# Patient Record
Sex: FEMALE | Employment: UNEMPLOYED | URBAN - METROPOLITAN AREA
[De-identification: names, ages, dates, MRNs, and addresses within clinical notes are randomized per-mention and may not be internally consistent; named-entity substitution may affect disease eponyms.]

---

## 2024-02-19 ENCOUNTER — APPOINTMENT (OUTPATIENT)
Dept: RADIOLOGY | Facility: AMBULARY SURGERY CENTER | Age: 24
End: 2024-02-19
Payer: COMMERCIAL

## 2024-02-19 ENCOUNTER — OFFICE VISIT (OUTPATIENT)
Dept: OBGYN CLINIC | Facility: CLINIC | Age: 24
End: 2024-02-19
Payer: COMMERCIAL

## 2024-02-19 VITALS — WEIGHT: 130 LBS | BODY MASS INDEX: 23.92 KG/M2 | HEIGHT: 62 IN

## 2024-02-19 DIAGNOSIS — M54.6 ACUTE RIGHT-SIDED THORACIC BACK PAIN: ICD-10-CM

## 2024-02-19 DIAGNOSIS — M54.6 ACUTE RIGHT-SIDED THORACIC BACK PAIN: Primary | ICD-10-CM
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
## 2024-02-19 PROBLEM — M54.50 ACUTE BILATERAL LOW BACK PAIN WITHOUT SCIATICA: Status: ACTIVE | Noted: 2024-02-19

## 2024-02-19 PROCEDURE — 99204 OFFICE O/P NEW MOD 45 MIN: CPT | Performed by: PHYSICAL MEDICINE & REHABILITATION

## 2024-02-19 PROCEDURE — 72072 X-RAY EXAM THORAC SPINE 3VWS: CPT

## 2024-02-19 RX ORDER — NAPROXEN 500 MG/1
500 TABLET ORAL 2 TIMES DAILY PRN
Qty: 60 TABLET | Refills: 0 | Status: SHIPPED | OUTPATIENT
Start: 2024-02-19

## 2024-02-19 NOTE — LETTER
To Whom It May Concern,    Windy Alvarez is under my professional care.  She was seen in my office on February 19, 2024.      She is cleared to return to work with the following restrictions:    Desk duty only.  No lifting/pulling/pushing above 5 pounds.  If this is not available, she remains out of work.  Windy will be reevaluated in about 3-4 weeks.    Please excuse Windy Alvarez from any work missed on this appointment date.    If you have any questions or concerns, please do not hesitate to call.        Sincerely,          Vargas Yoo, DO

## 2024-02-19 NOTE — PATIENT INSTRUCTIONS
Room temperature/warm soaks with epsom salt can help with muscle tightness/cramping.  Epsom salt releases magnesium which can be helpful.    Over-the-counter salonpas patches can be applied to the area of discomfort to help with pain.  There are two types of patches; one contains lidocaine 4% and the other contains menthol (and sometimes camphor and methyl salicylate).  You can try one or the other and see which one works best for you.     You will be starting physical therapy.      It is important to do home exercises as given by your physical therapist as you go through PT to speed up your recovery.    Physical therapy addresses the problems that are causing your pain, instead of covering them up, as some other treatment options do.     Rules for limiting activity by pain symptoms:      -You can work through some pain, but keep it at a level from which you are distractible. Pain should not exceed 3/10 in severity.   -Do not change your biomechanics / form with your activity.  This can lead to further injury.   -If you pay for your activity later with substantial symptom exacerbation, you are not yet ready for that level of exertion.   
Never smoker
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
## 2024-02-20 ENCOUNTER — TELEPHONE (OUTPATIENT)
Dept: OBGYN CLINIC | Facility: HOSPITAL | Age: 24
End: 2024-02-20

## 2024-02-20 NOTE — TELEPHONE ENCOUNTER
Patient given fax number for [facility] to have forms faxed over.
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
## 2024-03-11 ENCOUNTER — TELEPHONE (OUTPATIENT)
Age: 24
End: 2024-03-11

## 2024-03-11 NOTE — TELEPHONE ENCOUNTER
Caller: patient    Doctor:     Reason for call: patient did not attend PT appointment due to a family emergency, patient would like to know what to do moving forward & would like to know if she should keep appointment for 03/15/2024  Please advise     Call back#: [phone]

## 2024-03-19 ENCOUNTER — EVALUATION (OUTPATIENT)
Dept: PHYSICAL THERAPY | Facility: CLINIC | Age: 24
End: 2024-03-19
Payer: COMMERCIAL

## 2024-03-19 DIAGNOSIS — M54.12 CERVICAL RADICULOPATHY: ICD-10-CM

## 2024-03-19 DIAGNOSIS — M54.6 ACUTE RIGHT-SIDED THORACIC BACK PAIN: Primary | ICD-10-CM

## 2024-03-19 PROCEDURE — 97110 THERAPEUTIC EXERCISES: CPT | Performed by: PHYSICAL THERAPIST

## 2024-03-19 PROCEDURE — 97161 PT EVAL LOW COMPLEX 20 MIN: CPT | Performed by: PHYSICAL THERAPIST

## 2024-03-19 PROCEDURE — 97112 NEUROMUSCULAR REEDUCATION: CPT | Performed by: PHYSICAL THERAPIST
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
## 2024-03-22 ENCOUNTER — TELEPHONE (OUTPATIENT)
Age: 24
End: 2024-03-22

## 2024-03-22 ENCOUNTER — OFFICE VISIT (OUTPATIENT)
Dept: PHYSICAL THERAPY | Facility: CLINIC | Age: 24
End: 2024-03-22
Payer: COMMERCIAL

## 2024-03-22 DIAGNOSIS — M54.12 CERVICAL RADICULOPATHY: ICD-10-CM

## 2024-03-22 DIAGNOSIS — M54.6 ACUTE RIGHT-SIDED THORACIC BACK PAIN: Primary | ICD-10-CM

## 2024-03-22 PROCEDURE — 97112 NEUROMUSCULAR REEDUCATION: CPT

## 2024-03-22 PROCEDURE — 97110 THERAPEUTIC EXERCISES: CPT

## 2024-03-22 NOTE — TELEPHONE ENCOUNTER
Caller: Lyssa Richardson     Doctor: Fredo    Reason for call: Calling to verify disability forms where received- let her know they where scanned into patient's chart this morning, and will be completed and sent back with in 10 days.     Call back#: [phone]

## 2024-03-22 NOTE — PROGRESS NOTES
Daily Note     Today's date: 3/22/2024  Patient name: Windy Alvarez  : 2000  MRN: [id number]  Referring provider: Vargas Yoo DO  Dx:   Encounter Diagnosis     ICD-10-CM    1. Acute right-sided thoracic back pain  M54.6       2. Cervical radiculopathy  M54.12           Start Time: 1500  Stop Time: 1534  Total time in clinic (min): 34 minutes    Subjective: Pt reports being very sore following the IE. Complaint with HEP.     Objective: See treatment diary below      Assessment: Tolerated treatment well. Patient demonstrated fatigue post treatment      Plan: Continue per plan of care.      Precautions: Fall Concern    Manuals 3/19 03/22                                                               Neuro Re-Ed             Education and progression 10 Min 10 min            Bank Account/Cut Finger performed            Sitting tolerance education performed                                                                Ther Ex             CS Retraction 5 x 5-10 NB 2x10            progression Pt OP I NW.                          Supine Retraction  4 x 5 D NB 2x10            Progression?                                                     Ther Activity             CS R SB/Rot and Ext B B           Shoulder ER strength 3+ to 5 B            Other concerns?              Lifting?                            Modalities             HP prn.   10 min MHP

## 2024-03-27 ENCOUNTER — OFFICE VISIT (OUTPATIENT)
Dept: PHYSICAL THERAPY | Facility: CLINIC | Age: 24
End: 2024-03-27
Payer: COMMERCIAL

## 2024-03-27 DIAGNOSIS — M54.6 ACUTE RIGHT-SIDED THORACIC BACK PAIN: Primary | ICD-10-CM

## 2024-03-27 DIAGNOSIS — M54.12 CERVICAL RADICULOPATHY: ICD-10-CM

## 2024-03-27 PROCEDURE — 97110 THERAPEUTIC EXERCISES: CPT

## 2024-03-27 PROCEDURE — 97112 NEUROMUSCULAR REEDUCATION: CPT

## 2024-03-27 NOTE — PROGRESS NOTES
Daily Note     Today's date: 3/27/2024  Patient name: Windy Alvarez  : 2000  MRN: [id number]  Referring provider: Vargas Yoo DO  Dx:   Encounter Diagnosis     ICD-10-CM    1. Acute right-sided thoracic back pain  M54.6       2. Cervical radiculopathy  M54.12           Start Time: 1531  Stop Time: 1605  Total time in clinic (min): 34 minutes    Subjective: Pt reports that she bought a lumbar roll and has been very cognizant of her posture. Maximally compliant with HEP.       Objective: See treatment diary below      Assessment: Tolerated treatment well. Did well with TS ext. Tolerates supine CS ret better than seated. Patient would benefit from continued PT      Plan: Continue per plan of care.      Precautions: Fall Concern    Manuals 3/19 03/22 03/27                                                              Neuro Re-Ed             Education and progression 10 Min 10 min  10 min           Bank Account/Cut Finger performed            Sitting tolerance education performed                                                                Ther Ex             CS Retraction 5 x 5-10 NB 2x10  3x5           progression Pt OP I NW.                          Supine Retraction  4 x 5 D NB 2x10  4x5           Progression?              TS ext    2x5                                     Ther Activity             CS R SB/Rot and Ext B B B          Shoulder ER strength 3+ to 5 B  B           Other concerns?              Lifting?                            Modalities             HP prn.   10 min MHP 10 min MHP

## 2024-03-29 ENCOUNTER — OFFICE VISIT (OUTPATIENT)
Dept: PHYSICAL THERAPY | Facility: CLINIC | Age: 24
End: 2024-03-29
Payer: COMMERCIAL

## 2024-03-29 DIAGNOSIS — M54.12 CERVICAL RADICULOPATHY: ICD-10-CM

## 2024-03-29 DIAGNOSIS — M54.6 ACUTE RIGHT-SIDED THORACIC BACK PAIN: Primary | ICD-10-CM

## 2024-03-29 PROCEDURE — 97110 THERAPEUTIC EXERCISES: CPT

## 2024-03-29 PROCEDURE — 97112 NEUROMUSCULAR REEDUCATION: CPT

## 2024-03-29 NOTE — PROGRESS NOTES
Daily Note     Today's date: 3/29/2024  Patient name: Windy Alvarez  : 2000  MRN: [id number]  Referring provider: Vargas Yoo DO  Dx:   Encounter Diagnosis     ICD-10-CM    1. Acute right-sided thoracic back pain  M54.6       2. Cervical radiculopathy  M54.12           Start Time: 1500  Stop Time: 1535  Total time in clinic (min): 35 minutes    Subjective: Pt reports that she remains compliant with her HEP.       Objective: See treatment diary below      Assessment: Tolerated treatment well.  Patient would benefit from continued PT      Plan: Continue per plan of care.      Precautions: Fall Concern    Manuals 3/19 03/22 03/27 03/29                                                             Neuro Re-Ed             Education and progression 10 Min 10 min  10 min  10 min          Bank Account/Cut Finger performed            Sitting tolerance education performed                                                                Ther Ex             CS Retraction 5 x 5-10 NB 2x10  3x5  3x5         progression Pt OP I NW.                          Supine Retraction  4 x 5 D NB 2x10  4x5  4x5          Progression?              TS ext    2x5  2x5                                    Ther Activity             CS R SB/Rot and Ext B B B          Shoulder ER strength 3+ to 5 B  B           Other concerns?              Lifting?                            Modalities             HP prn.   10 min MHP 10 min MHP 10 min MHP

## 2024-04-03 ENCOUNTER — OFFICE VISIT (OUTPATIENT)
Dept: PHYSICAL THERAPY | Facility: CLINIC | Age: 24
End: 2024-04-03
Payer: COMMERCIAL

## 2024-04-03 DIAGNOSIS — M54.6 ACUTE RIGHT-SIDED THORACIC BACK PAIN: Primary | ICD-10-CM

## 2024-04-03 DIAGNOSIS — M54.12 CERVICAL RADICULOPATHY: ICD-10-CM

## 2024-04-03 PROCEDURE — 97110 THERAPEUTIC EXERCISES: CPT

## 2024-04-03 PROCEDURE — 97112 NEUROMUSCULAR REEDUCATION: CPT

## 2024-04-03 NOTE — PROGRESS NOTES
Daily Note     Today's date: 4/3/2024  Patient name: Windy Alvarez  : 2000  MRN: [id number]  Referring provider: Vargas Yoo DO  Dx:   Encounter Diagnosis     ICD-10-CM    1. Acute right-sided thoracic back pain  M54.6       2. Cervical radiculopathy  M54.12           Start Time: 1531  Stop Time: 1610  Total time in clinic (min): 39 minutes    Subjective: Pt reports more R sided neck pain. Notices that when she does her retractions it bothers her, but then goes away.       Objective: See treatment diary below      Assessment: Tolerated treatment well. No response to lateral movements. Patient would benefit from continued PT      Plan: Continue per plan of care.      Precautions: Fall Concern    Manuals 3/19 03/22 03/27 03/29 04/03                                                            Neuro Re-Ed             Education and progression 10 Min 10 min  10 min  10 min  10 min         Bank Account/Cut Finger performed            Sitting tolerance education performed                                                                Ther Ex             CS Retraction 5 x 5-10 NB 2x10  3x5  3x5 3x5   I, NW        progression Pt OP I NW.             R SB      2x10 NE        R SB +OP     4x10   NE        Supine Retraction  4 x 5 D NB 2x10  4x5  4x5  4x5         Progression?              TS ext    2x5  2x5  2x5                                   Ther Activity             CS R SB/Rot and Ext B B B  nil        Shoulder ER strength 3+ to 5 B  B           Other concerns?              Lifting?                            Modalities             HP prn.   10 min MHP 10 min MHP 10 min MHP  10 min MHP

## 2024-04-08 ENCOUNTER — OFFICE VISIT (OUTPATIENT)
Dept: PHYSICAL THERAPY | Facility: CLINIC | Age: 24
End: 2024-04-08
Payer: COMMERCIAL

## 2024-04-08 DIAGNOSIS — M54.6 ACUTE RIGHT-SIDED THORACIC BACK PAIN: Primary | ICD-10-CM

## 2024-04-08 DIAGNOSIS — M54.12 CERVICAL RADICULOPATHY: ICD-10-CM

## 2024-04-08 PROCEDURE — 97110 THERAPEUTIC EXERCISES: CPT

## 2024-04-08 PROCEDURE — 97112 NEUROMUSCULAR REEDUCATION: CPT

## 2024-04-08 NOTE — PROGRESS NOTES
"Daily Note     Today's date: 2024  Patient name: Windy Alvarez  : 2000  MRN: [id number]  Referring provider: Vargas Yoo DO  Dx:   Encounter Diagnosis     ICD-10-CM    1. Acute right-sided thoracic back pain  M54.6       2. Cervical radiculopathy  M54.12                      Subjective: Patient reports her neck pain came back but she did the exercises and it went away. At start of session she denies cervical radicular ex. She does note a \"knot\" in her shoulder blade.       Objective: See treatment diary below      Assessment: Cont with current POC. She cont to have positive response to ext biased exercises although she has not had lasting change as of yet. She had decreased sx that were less intense following ext seated on foam to address lower T/S ext. Encouraged to perform every 2 hrs and as symptoms arise, stopping if symptoms peripheral. Patient demonstrated fatigue post treatment and would benefit from continued PT      Plan: Progress treatment as tolerated.       Precautions: Fall Concern    Manuals 3/19 03/22 03/27 03/29 04/03 04/08                                                           Neuro Re-Ed             Education and progression 10 Min 10 min  10 min  10 min  10 min  10 min       Bank Account/Cut Finger performed            Sitting tolerance education performed                                                                Ther Ex             CS Retraction 5 x 5-10 NB 2x10  3x5  3x5 3x5   I, NW 3x5       progression Pt OP I NW.             R SB      2x10 NE        R SB +OP     4x10   NE        Supine Retraction  4 x 5 D NB 2x10  4x5  4x5  4x5  4x5       Progression?              TS ext    2x5  2x5  2x5  3x5  D, NW  On foam   2x5  D, B                                 Ther Activity             CS R SB/Rot and Ext B B B  nil        Shoulder ER strength 3+ to 5 B  B           Other concerns?              Lifting?                            Modalities             HP prn.   10 min MHP 10 " min MHP 10 min MHP  10 min MHP

## 2024-04-10 ENCOUNTER — OFFICE VISIT (OUTPATIENT)
Dept: PHYSICAL THERAPY | Facility: CLINIC | Age: 24
End: 2024-04-10
Payer: COMMERCIAL

## 2024-04-10 DIAGNOSIS — M54.12 CERVICAL RADICULOPATHY: ICD-10-CM

## 2024-04-10 DIAGNOSIS — M54.6 ACUTE RIGHT-SIDED THORACIC BACK PAIN: Primary | ICD-10-CM

## 2024-04-10 PROCEDURE — 97110 THERAPEUTIC EXERCISES: CPT

## 2024-04-10 NOTE — PROGRESS NOTES
"Daily Note     Today's date: 4/10/2024  Patient name: Windy Alvarez  : 2000  MRN: [id number]  Referring provider: Vargas Yoo DO  Dx:   Encounter Diagnosis     ICD-10-CM    1. Acute right-sided thoracic back pain  M54.6       2. Cervical radiculopathy  M54.12           Start Time: 1530  Stop Time: 1605  Total time in clinic (min): 35 minutes    Subjective: Pt reports that she can tolerated seated retraction a little more.       Objective: See treatment diary below  Presents asymptomatic     Assessment: Tolerated treatment well. Continues with limited tolerance to seated CS retraction.  Patient would benefit from continued PT      Plan: Continue per plan of care.      Precautions: Fall Concern    Manuals 3/19 03/22 03/27 03/29 04/03 04/08 04/10                                                          Neuro Re-Ed             Education and progression 10 Min 10 min  10 min  10 min  10 min  10 min 5 min       Bank Account/Cut Finger performed            Sitting tolerance education performed            Scap retraction        10x5\"      Scaption        2x10                                 Ther Ex             CS Retraction 5 x 5-10 NB 2x10  3x5  3x5 3x5   I, NW 3x5 3x5       progression Pt OP I NW.             R SB      2x10 NE        R SB +OP     4x10   NE        Supine Retraction  4 x 5 D NB 2x10  4x5  4x5  4x5  4x5 4x5       Progression?              TS ext    2x5  2x5  2x5  3x5  D, NW  On foam   2x5  D, B 4x5                                 Ther Activity             CS R SB/Rot and Ext B B B  nil  Nil       Shoulder ER strength 3+ to 5 B  B           Other concerns?              Lifting?                            Modalities             HP prn.   10 min MHP 10 min MHP 10 min MHP  10 min MHP  10 min MHP                                "

## 2024-04-15 ENCOUNTER — APPOINTMENT (OUTPATIENT)
Dept: PHYSICAL THERAPY | Facility: CLINIC | Age: 24
End: 2024-04-15
Payer: COMMERCIAL

## 2024-04-17 ENCOUNTER — OFFICE VISIT (OUTPATIENT)
Dept: PHYSICAL THERAPY | Facility: CLINIC | Age: 24
End: 2024-04-17
Payer: COMMERCIAL

## 2024-04-17 DIAGNOSIS — M54.6 ACUTE RIGHT-SIDED THORACIC BACK PAIN: Primary | ICD-10-CM

## 2024-04-17 DIAGNOSIS — M54.12 CERVICAL RADICULOPATHY: ICD-10-CM

## 2024-04-17 PROCEDURE — 97112 NEUROMUSCULAR REEDUCATION: CPT

## 2024-04-17 PROCEDURE — 97110 THERAPEUTIC EXERCISES: CPT

## 2024-04-17 NOTE — PROGRESS NOTES
"Daily Note     Today's date: 2024  Patient name: Windy Alvarez  : 2000  MRN: [id number]  Referring provider: Vargas Yoo DO  Dx:   Encounter Diagnosis     ICD-10-CM    1. Acute right-sided thoracic back pain  M54.6       2. Cervical radiculopathy  M54.12                      Subjective: Pt reports that she has noticed that she is able to lift heavier plates now. Remains maximally compliant with HEP.       Objective: See treatment diary below      Assessment: Tolerated treatment well. Good range with CS ret+ext but reps limited to tolerance. Patient would benefit from continued PT      Plan: Continue per plan of care.      Precautions: Fall Concern    Manuals 3/19 03/22 03/27 03/29 04/03 04/08 04/10 04/17                                                         Neuro Re-Ed             Education and progression 10 Min 10 min  10 min  10 min  10 min  10 min 5 min  5 min      Bank Account/Cut Finger performed            Sitting tolerance education performed            Scap retraction        10x5\" 10x5\"      Scaption        2x10  2x10                                Ther Ex             CS Retraction 5 x 5-10 NB 2x10  3x5  3x5 3x5   I, NW 3x5 3x5  3x5     progression Pt OP I NW.        +ext 5x     R SB      2x10 NE        R SB +OP     4x10   NE        Supine Retraction  4 x 5 D NB 2x10  4x5  4x5  4x5  4x5 4x5  4x5     Progression?              TS ext    2x5  2x5  2x5  3x5  D, NW  On foam   2x5  D, B 4x5  4x5                                Ther Activity             CS R SB/Rot and Ext B B B  nil  Nil       Shoulder ER strength 3+ to 5 B  B           Other concerns?              Lifting?                            Modalities             HP prn.   10 min MHP 10 min MHP 10 min MHP  10 min MHP  10 min MHP 10 min MHP                                 "

## 2024-04-24 ENCOUNTER — EVALUATION (OUTPATIENT)
Dept: PHYSICAL THERAPY | Facility: CLINIC | Age: 24
End: 2024-04-24
Payer: COMMERCIAL

## 2024-04-24 DIAGNOSIS — M54.6 ACUTE RIGHT-SIDED THORACIC BACK PAIN: Primary | ICD-10-CM

## 2024-04-24 DIAGNOSIS — M54.12 CERVICAL RADICULOPATHY: ICD-10-CM

## 2024-04-24 PROCEDURE — 97112 NEUROMUSCULAR REEDUCATION: CPT | Performed by: PHYSICAL THERAPIST

## 2024-04-24 PROCEDURE — 97110 THERAPEUTIC EXERCISES: CPT | Performed by: PHYSICAL THERAPIST

## 2024-04-26 ENCOUNTER — OFFICE VISIT (OUTPATIENT)
Dept: PHYSICAL THERAPY | Facility: CLINIC | Age: 24
End: 2024-04-26
Payer: COMMERCIAL

## 2024-04-26 DIAGNOSIS — M54.6 ACUTE RIGHT-SIDED THORACIC BACK PAIN: Primary | ICD-10-CM

## 2024-04-26 DIAGNOSIS — M54.12 CERVICAL RADICULOPATHY: ICD-10-CM

## 2024-04-26 PROCEDURE — 97110 THERAPEUTIC EXERCISES: CPT

## 2024-04-26 NOTE — PROGRESS NOTES
"Daily Note     Today's date: 2024  Patient name: Windy Alvarez  : 2000  MRN: [id number]  Referring provider: Vargas Yoo DO  Dx:   Encounter Diagnosis     ICD-10-CM    1. Acute right-sided thoracic back pain  M54.6       2. Cervical radiculopathy  M54.12           Start Time: 1530  Stop Time: 1600  Total time in clinic (min): 30 minutes    Subjective: Pt reports that CS ret+ext is hard for her.       Objective: See treatment diary below      Assessment: Tolerated treatment well. ROM still improving. Patient would benefit from continued PT      Plan: Continue per plan of care.      Precautions: Fall Concern    Manuals 3/19 03/22 03/27 03/29 04/03 04/08 04/10 04/17 4/24 04/26                                                       Neuro Re-Ed             Education and progression 10 Min 10 min  10 min  10 min  10 min  10 min 5 min  5 min  10 min    Bank Account/Cut Finger performed            Sitting tolerance education performed            Scap retraction        10x5\" 10x5\"  10 x 5\" 10x5\"   Scaption        2x10  2x10  2 x 10 2x10                              Ther Ex             CS Retraction 5 x 5-10 NB 2x10  3x5  3x5 3x5   I, NW 3x5 3x5  3x5 4 x 5 4x5    progression Pt OP I NW.        +ext 5x 2 x 5 5x +ext    R SB      2x10 NE        R SB +OP     4x10   NE        Supine Retraction  4 x 5 D NB 2x10  4x5  4x5  4x5  4x5 4x5  4x5 4  x5 4x5    Progression?              TS ext    2x5  2x5  2x5  3x5  D, NW  On foam   2x5  D, B 4x5  4x5  4 x 5 4x5                              Ther Activity             CS R SB/Rot and Ext B B B  nil  Nil       Shoulder ER strength 3+ to 5 B  B           Other concerns?              Lifting?                            Modalities             HP prn.   10 min MHP 10 min MHP 10 min MHP  10 min MHP  10 min MHP 10 min MHP dec 10 min MHP                         "

## 2024-04-29 ENCOUNTER — OFFICE VISIT (OUTPATIENT)
Dept: PHYSICAL THERAPY | Facility: CLINIC | Age: 24
End: 2024-04-29
Payer: COMMERCIAL

## 2024-04-29 DIAGNOSIS — M54.12 CERVICAL RADICULOPATHY: ICD-10-CM

## 2024-04-29 DIAGNOSIS — M54.6 ACUTE RIGHT-SIDED THORACIC BACK PAIN: Primary | ICD-10-CM

## 2024-04-29 PROCEDURE — 97110 THERAPEUTIC EXERCISES: CPT

## 2024-04-29 PROCEDURE — 97112 NEUROMUSCULAR REEDUCATION: CPT

## 2024-04-29 NOTE — PROGRESS NOTES
"Daily Note     Today's date: 2024  Patient name: Windy Alvarez  : 2000  MRN: [id number]  Referring provider: Vargas Yoo DO  Dx:   Encounter Diagnosis     ICD-10-CM    1. Acute right-sided thoracic back pain  M54.6       2. Cervical radiculopathy  M54.12           Start Time: 1530  Stop Time: 1555  Total time in clinic (min): 25 minutes    Subjective: Pt reports that she still has some R sided pain. Compliance with HEP.        Objective: See treatment diary below      Assessment: Tolerated treatment well. R SB had NE on pain but did improve that motion. Patient would benefit from continued PT      Plan: Continue per plan of care.      Precautions: Fall Concern    Manuals 04/29  03/27 03/29 04/03 04/08 04/10 04/17 4/24 04/26                                                       Neuro Re-Ed             Education and progression   10 min  10 min  10 min  10 min 5 min  5 min  10 min    Bank Account/Cut Finger             Sitting tolerance education             Scap retraction  10x5\"      10x5\" 10x5\"  10 x 5\" 10x5\"   Scaption  2x10      2x10  2x10  2 x 10 2x10                              Ther Ex             CS Retraction 4x5  3x5  3x5 3x5   I, NW 3x5 3x5  3x5 4 x 5 4x5    progression 2x5 +ext       +ext 5x 2 x 5 5x +ext    R SB  3x5   NE    2x10 NE        R SB +OP     4x10   NE        Supine Retraction    4x5  4x5  4x5  4x5 4x5  4x5 4  x5 4x5    Progression?              TS ext  4x5   2x5  2x5  2x5  3x5  D, NW  On foam   2x5  D, B 4x5  4x5  4 x 5 4x5                              Ther Activity             CS R SB/Rot and Ext   B  nil  Nil       Shoulder ER strength   B           Other concerns?              Lifting?                            Modalities             HP prn.  Dec   10 min MHP 10 min MHP  10 min MHP  10 min MHP 10 min MHP dec 10 min MHP                           "

## 2024-05-01 ENCOUNTER — OFFICE VISIT (OUTPATIENT)
Dept: PHYSICAL THERAPY | Facility: CLINIC | Age: 24
End: 2024-05-01
Payer: COMMERCIAL

## 2024-05-01 DIAGNOSIS — M54.6 ACUTE RIGHT-SIDED THORACIC BACK PAIN: Primary | ICD-10-CM

## 2024-05-01 DIAGNOSIS — M54.12 CERVICAL RADICULOPATHY: ICD-10-CM

## 2024-05-01 PROCEDURE — 97110 THERAPEUTIC EXERCISES: CPT

## 2024-05-01 PROCEDURE — 97112 NEUROMUSCULAR REEDUCATION: CPT

## 2024-05-01 NOTE — PROGRESS NOTES
"Daily Note     Today's date: 2024  Patient name: Windy Alvarez  : 2000  MRN: [id number]  Referring provider: Vargas Yoo DO  Dx:   Encounter Diagnosis     ICD-10-CM    1. Acute right-sided thoracic back pain  M54.6       2. Cervical radiculopathy  M54.12           Start Time: 1600  Stop Time: 1640  Total time in clinic (min): 40 minutes    Subjective: Pt reports that she notices cervical pain when puts dishes away.       Objective: See treatment diary below      Assessment: Tolerated treatment well.Progressed with functional strengthening with fatigue. Repeated SB does not appear to influence symptoms.  Patient would benefit from continued PT      Plan: Continue per plan of care.      Precautions: Fall Concern    Manuals 04/29 05/01  03/29 04/03 04/08 04/10 04/17 4/24 04/26                                                       Neuro Re-Ed             Education and progression    10 min  10 min  10 min 5 min  5 min  10 min    Bank Account/Cut Finger             Sitting tolerance education             Scap retraction  10x5\"      10x5\" 10x5\"  10 x 5\" 10x5\"   Scaption  2x10 2x10 1#     2x10  2x10  2 x 10 2x10    TB row/ext   RTB 2x10                         Ther Ex             CS Retraction 4x5 4x5  3x5 3x5   I, NW 3x5 3x5  3x5 4 x 5 4x5    progression 2x5 +ext 3x5 +ext      +ext 5x 2 x 5 5x +ext    R SB  3x5   NE    2x10 NE        R SB +OP     4x10   NE        Supine Retraction     4x5  4x5  4x5 4x5  4x5 4  x5 4x5    Progression?              TS ext  4x5  4x5  2x5  2x5  3x5  D, NW  On foam   2x5  D, B 4x5  4x5  4 x 5 4x5                              Ther Activity             CS R SB/Rot and Ext     nil  Nil       Shoulder ER strength             Other concerns?              Lifting?                            Modalities             HP prn.  Dec    10 min MHP  10 min MHP  10 min MHP 10 min MHP dec 10 min MHP                             "

## 2024-05-06 ENCOUNTER — OFFICE VISIT (OUTPATIENT)
Dept: OBGYN CLINIC | Facility: CLINIC | Age: 24
End: 2024-05-06
Payer: COMMERCIAL

## 2024-05-06 ENCOUNTER — OFFICE VISIT (OUTPATIENT)
Dept: PHYSICAL THERAPY | Facility: CLINIC | Age: 24
End: 2024-05-06
Payer: COMMERCIAL

## 2024-05-06 VITALS
HEART RATE: 70 BPM | SYSTOLIC BLOOD PRESSURE: 126 MMHG | BODY MASS INDEX: 23.92 KG/M2 | DIASTOLIC BLOOD PRESSURE: 79 MMHG | WEIGHT: 130 LBS | HEIGHT: 62 IN

## 2024-05-06 DIAGNOSIS — M54.12 CERVICAL RADICULOPATHY: ICD-10-CM

## 2024-05-06 DIAGNOSIS — M54.6 ACUTE RIGHT-SIDED THORACIC BACK PAIN: Primary | ICD-10-CM

## 2024-05-06 PROCEDURE — 97112 NEUROMUSCULAR REEDUCATION: CPT

## 2024-05-06 PROCEDURE — 99213 OFFICE O/P EST LOW 20 MIN: CPT | Performed by: PHYSICAL MEDICINE & REHABILITATION

## 2024-05-06 PROCEDURE — 97110 THERAPEUTIC EXERCISES: CPT
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
## 2024-05-06 NOTE — LETTER
To Whom It May Concern,     Windy Alvarez is under my professional care.  She was seen in my office on May 6, 2024.       She is cleared to return to work with the following restrictions:     Desk duty only.  No lifting/pulling/pushing above 5 pounds.  If this is not available, she remains out of work.     Please excuse Windy Alvarez from any work missed on this appointment date.     If you have any questions or concerns, please do not hesitate to call.           Sincerely,            Vargas Yoo, DO

## 2024-05-06 NOTE — PROGRESS NOTES
"Daily Note     Today's date: 2024  Patient name: Windy Alvarez  : 2000  MRN: [id number]  Referring provider: Vargas Yoo DO  Dx:   Encounter Diagnosis     ICD-10-CM    1. Acute right-sided thoracic back pain  M54.6       2. Cervical radiculopathy  M54.12           Start Time: 1600  Stop Time: 1640  Total time in clinic (min): 40 minutes    Subjective: Pt reports soreness following housecleaning.       Objective: See treatment diary below      Assessment: Tolerated treatment well. Improving tolerance to CS ret+ext. Patient would benefit from continued PT      Plan: Continue per plan of care.      Precautions: Fall Concern HEP [id number]     Manuals 04/29 05/01 05/06  04/03 04/08 04/10 04/17 4/24 04/26                                                       Neuro Re-Ed             Education and progression     10 min  10 min 5 min  5 min  10 min    Bank Account/Cut Finger             Sitting tolerance education             Scap retraction  10x5\"      10x5\" 10x5\"  10 x 5\" 10x5\"   Scaption  2x10 2x10 1# 2x10 1#    2x10  2x10  2 x 10 2x10    TB row/ext   RTB 2x10  RTB 2x10                        Ther Ex             CS Retraction 4x5 4x5 4x5   3x5   I, NW 3x5 3x5  3x5 4 x 5 4x5    progression 2x5 +ext 3x5 +ext 3x5 +ext     +ext 5x 2 x 5 5x +ext    R SB  3x5   NE    2x10 NE        R SB +OP     4x10   NE        Supine Retraction      4x5  4x5 4x5  4x5 4  x5 4x5    Progression?              TS ext  4x5  4x5 4x5   2x5  3x5  D, NW  On foam   2x5  D, B 4x5  4x5  4 x 5 4x5                              Ther Activity             CS R SB/Rot and Ext     nil  Nil       Shoulder ER strength             Other concerns?              Lifting?                            Modalities             HP prn.  Dec   10 min MHP  10 min MHP  10 min MHP 10 min MHP dec 10 min MHP                               "

## 2024-05-08 ENCOUNTER — OFFICE VISIT (OUTPATIENT)
Dept: PHYSICAL THERAPY | Facility: CLINIC | Age: 24
End: 2024-05-08
Payer: COMMERCIAL

## 2024-05-08 DIAGNOSIS — M54.6 ACUTE RIGHT-SIDED THORACIC BACK PAIN: Primary | ICD-10-CM

## 2024-05-08 DIAGNOSIS — M54.12 CERVICAL RADICULOPATHY: ICD-10-CM

## 2024-05-08 PROCEDURE — 97112 NEUROMUSCULAR REEDUCATION: CPT

## 2024-05-08 PROCEDURE — 97110 THERAPEUTIC EXERCISES: CPT

## 2024-05-08 NOTE — PROGRESS NOTES
"Daily Note     Today's date: 2024  Patient name: Windy Alvarez  : 2000  MRN: [id number]  Referring provider: Vargas Yoo DO  Dx:   Encounter Diagnosis     ICD-10-CM    1. Acute right-sided thoracic back pain  M54.6       2. Cervical radiculopathy  M54.12                      Subjective: Pt reports that her MD ordered an MRI.       Objective: See treatment diary below      Assessment: Tolerated treatment well. Fatiguing with current interventions. Patient would benefit from continued PT      Plan: Continue per plan of care.      Precautions: Fall Concern HEP [id number]     Manuals 04/29 05/01 05/06 05/08  04/08 04/10 04/17 4/24 04/26                                                       Neuro Re-Ed             Education and progression    10 min  10 min 5 min  5 min  10 min    Bank Account/Cut Finger             Sitting tolerance education             Scap retraction  10x5\"      10x5\" 10x5\"  10 x 5\" 10x5\"   Scaption  2x10 2x10 1# 2x10 1# 6x 2#    2x10 1#    2x10  2x10  2 x 10 2x10    TB row/ext   RTB 2x10  RTB 2x10  RTB 2x10                       Ther Ex             CS Retraction 4x5 4x5 4x5  4x5   3x5 3x5  3x5 4 x 5 4x5    progression 2x5 +ext 3x5 +ext 3x5 +ext 3x5 +ext    +ext 5x 2 x 5 5x +ext    R SB  3x5   NE            R SB +OP             Supine Retraction       4x5 4x5  4x5 4  x5 4x5    Progression?              TS ext  4x5  4x5 4x5  4x5  3x5  D, NW  On foam   2x5  D, B 4x5  4x5  4 x 5 4x5                              Ther Activity             CS R SB/Rot and Ext       Nil       Shoulder ER strength             Other concerns?              Lifting?                            Modalities             HP prn.  Dec   10 min MHP dec   10 min MHP 10 min MHP dec 10 min MHP                                 "

## 2024-05-13 ENCOUNTER — OFFICE VISIT (OUTPATIENT)
Dept: PHYSICAL THERAPY | Facility: CLINIC | Age: 24
End: 2024-05-13
Payer: COMMERCIAL

## 2024-05-13 DIAGNOSIS — M54.6 ACUTE RIGHT-SIDED THORACIC BACK PAIN: Primary | ICD-10-CM

## 2024-05-13 DIAGNOSIS — M54.12 CERVICAL RADICULOPATHY: ICD-10-CM

## 2024-05-13 PROCEDURE — 97110 THERAPEUTIC EXERCISES: CPT

## 2024-05-13 PROCEDURE — 97112 NEUROMUSCULAR REEDUCATION: CPT

## 2024-05-13 NOTE — PROGRESS NOTES
"Daily Note     Today's date: 2024  Patient name: Windy Alvarez  : 2000  MRN: [id number]  Referring provider: Vargas Yoo DO  Dx:   Encounter Diagnosis     ICD-10-CM    1. Acute right-sided thoracic back pain  M54.6       2. Cervical radiculopathy  M54.12           Start Time: 1530  Stop Time: 1600  Total time in clinic (min): 30 minutes    Subjective: Pt reports that she has pain when she looks up or down.       Objective: See treatment diary below      Assessment: Tolerated treatment well. Fatigues easily with UE postural strengthening. Patient would benefit from continued PT      Plan: Continue per plan of care.      Precautions: Fall Concern HEP [id number]     Manuals 04/29 05/01 05/06 05/08 05/13  04/10 04/17 4/24 04/26                                                       Neuro Re-Ed             Education and progression    10 min   5 min  5 min  10 min    Bank Account/Cut Finger             Sitting tolerance education             Scap retraction  10x5\"      10x5\" 10x5\"  10 x 5\" 10x5\"   Scaption  2x10 2x10 1# 2x10 1# 6x 2#    2x10 1#  3x10 1#  2x10  2x10  2 x 10 2x10    TB row/ext   RTB 2x10  RTB 2x10  RTB 2x10  GTB 2x10                      Ther Ex             CS Retraction 4x5 4x5 4x5  4x5  4x5  3x5  3x5 4 x 5 4x5    progression 2x5 +ext 3x5 +ext 3x5 +ext 3x5 +ext 3x5 +ext   +ext 5x 2 x 5 5x +ext    R SB  3x5   NE            R SB +OP             Supine Retraction        4x5  4x5 4  x5 4x5    Progression?              TS ext  4x5  4x5 4x5  4x5 4x5   4x5  4x5  4 x 5 4x5                              Ther Activity             CS R SB/Rot and Ext       Nil       Shoulder ER strength             Other concerns?              Lifting?                            Modalities             HP prn.  Dec   10 min MHP dec dec  10 min MHP 10 min MHP dec 10 min MHP                                   "

## 2024-05-15 ENCOUNTER — OFFICE VISIT (OUTPATIENT)
Dept: PHYSICAL THERAPY | Facility: CLINIC | Age: 24
End: 2024-05-15
Payer: COMMERCIAL

## 2024-05-15 DIAGNOSIS — M54.6 ACUTE RIGHT-SIDED THORACIC BACK PAIN: Primary | ICD-10-CM

## 2024-05-15 DIAGNOSIS — M54.12 CERVICAL RADICULOPATHY: ICD-10-CM

## 2024-05-15 PROCEDURE — 97110 THERAPEUTIC EXERCISES: CPT

## 2024-05-15 PROCEDURE — 97112 NEUROMUSCULAR REEDUCATION: CPT

## 2024-05-15 NOTE — PROGRESS NOTES
"Daily Note     Today's date: 5/15/2024  Patient name: Windy Alvarez  : 2000  MRN: [id number]  Referring provider: Vargas Yoo DO  Dx:   Encounter Diagnosis     ICD-10-CM    1. Acute right-sided thoracic back pain  M54.6       2. Cervical radiculopathy  M54.12           Start Time: 1530  Stop Time: 1610  Total time in clinic (min): 40 minutes    Subjective: Pt reports that she changed the cat litter and felt sore after this. She did feel sore following last visit's progressions but only for 24 hours.       Objective: See treatment diary below      Assessment: Tolerated treatment well. No progressions due to soreness.  Patient would benefit from continued PT      Plan: Continue per plan of care.      Precautions: Fall Concern HEP [id number]     Manuals 04/29 05/01 05/06 05/08 05/13 05/15  04/17 4/24 04/26                                                       Neuro Re-Ed             Education and progression    10 min    5 min  10 min    Bank Account/Cut Finger             Sitting tolerance education             Scap retraction  10x5\"       10x5\"  10 x 5\" 10x5\"   Scaption  2x10 2x10 1# 2x10 1# 6x 2#    2x10 1#  3x10 1# 3x10 1#  2x10  2 x 10 2x10    TB row/ext   RTB 2x10  RTB 2x10  RTB 2x10  GTB 2x10  GTB 2x10                     Ther Ex             CS Retraction 4x5 4x5 4x5  4x5  4x5 4x5   3x5 4 x 5 4x5    progression 2x5 +ext 3x5 +ext 3x5 +ext 3x5 +ext 3x5 +ext 3x5 +ext  +ext 5x 2 x 5 5x +ext    R SB  3x5   NE            R SB +OP             Supine Retraction         4x5 4  x5 4x5    Progression?              TS ext  4x5  4x5 4x5  4x5 4x5  4x5   4x5  4 x 5 4x5                              Ther Activity             CS R SB/Rot and Ext             Shoulder ER strength             Other concerns?              Lifting?                            Modalities             HP prn.  Dec   10 min MHP dec dec 10 min MHP  10 min MHP dec 10 min MHP                                     "

## 2024-05-20 ENCOUNTER — EVALUATION (OUTPATIENT)
Dept: PHYSICAL THERAPY | Facility: CLINIC | Age: 24
End: 2024-05-20
Payer: COMMERCIAL

## 2024-05-20 DIAGNOSIS — M54.12 CERVICAL RADICULOPATHY: ICD-10-CM

## 2024-05-20 DIAGNOSIS — M54.6 ACUTE RIGHT-SIDED THORACIC BACK PAIN: Primary | ICD-10-CM

## 2024-05-20 PROCEDURE — 97112 NEUROMUSCULAR REEDUCATION: CPT | Performed by: PHYSICAL THERAPIST

## 2024-05-20 PROCEDURE — 97110 THERAPEUTIC EXERCISES: CPT | Performed by: PHYSICAL THERAPIST

## 2024-05-20 NOTE — PROGRESS NOTES
PT Evaluation     Today's date: 2024  Patient name: Windy Alvarez  : 2000  MRN: [id number]  Referring provider: Vargas Yoo DO  Dx:   Encounter Diagnosis     ICD-10-CM    1. Acute right-sided thoracic back pain  M54.6       2. Cervical radiculopathy  M54.12                          Assessment  Impairments: abnormal or restricted ROM, activity intolerance, impaired balance, impaired physical strength, lacks appropriate home exercise program, pain with function, poor posture , poor body mechanics and endurance    Assessment details: Pt is continuing to progress well at this time.  They have demonstrated further improvement in pain levels, strength, range, flexibility as well as overall tolerance to activity.  They will benefit continued Skilled PT intervention in order to achieve all LTGs sought out for them as well as by them in order to perform all desired activities with minimal to nil symptom exacerbation.  Pt demonstrated limitations in thoracic region that improved with repeated TS ext with pt OP.  Will continue to explore this.  Primary focus will be further pain level reduction, strength and endurance.  Thank you very much for this kind and motivated referral.        Understanding of Dx/Px/POC: good     Prognosis: good    Goals  STG 4 Weeks:  Decrease pain at worst to 5 -met  Improve range to improve CS range to min -met  Improve strength to R UE to 4/5.  -met  Independent with HEP -met  LTG 8 Weeks:  Decrease pain at worst to 2 -partial  Improve range to CS range to nil  Improve strength to 5/5.   -partial  Able to perform all desired activities with minimal to nil symptom exacerbation      Plan  Patient would benefit from: skilled physical therapy  Planned modality interventions: thermotherapy: hydrocollator packs and cryotherapy    Planned therapy interventions: joint mobilization, manual therapy, neuromuscular re-education, patient education, postural training, strengthening, stretching,  therapeutic activities, therapeutic exercise, therapeutic training, transfer training, home exercise program, IADL retraining, graded motor, graded exercise, graded activity, functional ROM exercises, flexibility, abdominal trunk stabilization, activity modification, behavior modification and body mechanics training    Frequency: 2x week  Duration in weeks: 10  Treatment plan discussed with: patient        Subjective Evaluation    History of Present Illness  Date of onset: 3/19/2024  Mechanism of injury: Pt presents today stating that she is continuing to feel better.  She reports that pain at worst is 4, at worst is 6/10, but this is very infrequent.  Pt reports that she has been able to go fishing for a few hours at a time, but she must require rest after 20 mins if there is a consistent amount of casting.  Pt reports that her flexibility is continuing make gains.  Pt reports that she recently met with Dr. Yoo who would like to have imagery performed of her ribs.  Pt reports neck is doing fairly well, pain is primarily in between and slightly lower than her shoulder blades. Pt reports goals are to further reduce pain, improve endurance, and be able to get back to how she was prior to accident.    Quality of life: good    Patient Goals  Patient goals for therapy: decreased pain, increased motion, increased strength and return to sport/leisure activities    Pain  Current pain rating: 3  At best pain ratin  At worst pain ratin  Quality: dull ache, tight and sharp  Relieving factors: change in position, heat and medications  Aggravating factors: lifting and sitting  Progression: improved      Diagnostic Tests  X-ray: normal  Treatments  Previous treatment: medication        Objective     Active Range of Motion   Cervical/Thoracic Spine       Cervical  Subcranial protraction:  WFL   Subcranial retraction:  WFL   Flexion:  WFL  Extension:  with pain Restriction level: minimal  Left lateral flexion:   "Restriction level: minimal  Right lateral flexion:  Restriction level minimal  Left rotation:  WFL  Right rotation:  WFL    Additional Active Range of Motion Details  Forward head, rounded shoulders  B/L Sensation intact to light touch C3,4,5,6,7,8,T1,T2  DTR Bi Tri Brac 1+ B/L  Spear (-) B/L  Postural correction R shoulder blade. Better.    UE Screen WFL range, Pain with R ER, and 5* R ER.   Repeated motion   Flex  Retraction R shoulder blade. NB.  Ret Pt OP I W.  Back to Retraction NB.  Supine Trial D NB.  Improved CS Range, ER strength.    Ext  SB R  SB L  TS ROM flex* mod, Ext Mod*, Rot L min, R mod*  SB L Min, R Mod*.    Joint Mobility  Palpation  STs  NLTT (-) B/L                Precautions: Fall Concern HEP [id number]     Manuals 04/29 05/01 05/06 05/08 05/13 05/15 5/20 04/17 4/24 04/26                                                       Neuro Re-Ed             Education and progression    10 min   10 min assessment  5 min  10 min    Bank Account/Cut Finger             Sitting tolerance education             Scap retraction  10x5\"       10x5\"  10 x 5\" 10x5\"   Scaption  2x10 2x10 1# 2x10 1# 6x 2#    2x10 1#  3x10 1# 3x10 1# 3 x 10 2# 2x10  2 x 10 2x10    TB row/ext   RTB 2x10  RTB 2x10  RTB 2x10  GTB 2x10  GTB 2x10  GTB 2  x10                   Ther Ex             CS Retraction 4x5 4x5 4x5  4x5  4x5 4x5  2 x 5 3x5 4 x 5 4x5    progression 2x5 +ext 3x5 +ext 3x5 +ext 3x5 +ext 3x5 +ext 3x5 +ext 2 x 5 +ext 5x 2 x 5 5x +ext    R SB  3x5   NE            R SB +OP             Supine Retraction         4x5 4  x5 4x5    Progression?              TS ext  4x5  4x5 4x5  4x5 4x5  4x5  4 x 5 4x5  4 x 5 4x5           OP 5 x                    Ther Activity             CS R SB/Rot and Ext             Shoulder ER strength             TS Flex (post TS Ext)       Better      TS R Rot                           Modalities             HP prn.  Dec   10 min MHP dec dec 10 min MHP dec 10 min MHP dec 10 min MHP                    MDT " Key:  ANR: Adherent Nerve root  P: Produce  B: Better  NB: No Better  W: Worse  NW: No worse  NE: No Effect.  D: Decrease  I: Increase  A: Abolish  R/Rep: Repeated  EIS: Ext in Standing  EIL: Ext in Lying  FIS: Flex in standing  FISit: Flex in sitting  SGIS: Side Glide in Standing  SGIP: Side Glide in Prone   Pro: Protrusion  Ret: Retraction  SB: Side Bend  Rot: Rotation  Ext: Extension   PE'd: peripheralized  Pe'g: Peripheralizing  CE'd: centralized  Ce'g: Centralizing

## 2024-05-22 ENCOUNTER — OFFICE VISIT (OUTPATIENT)
Dept: PHYSICAL THERAPY | Facility: CLINIC | Age: 24
End: 2024-05-22
Payer: COMMERCIAL

## 2024-05-22 DIAGNOSIS — M54.6 ACUTE RIGHT-SIDED THORACIC BACK PAIN: Primary | ICD-10-CM

## 2024-05-22 DIAGNOSIS — M54.12 CERVICAL RADICULOPATHY: ICD-10-CM

## 2024-05-22 PROCEDURE — 97110 THERAPEUTIC EXERCISES: CPT

## 2024-05-22 PROCEDURE — 97112 NEUROMUSCULAR REEDUCATION: CPT

## 2024-05-22 NOTE — PROGRESS NOTES
"Daily Note     Today's date: 2024  Patient name: Windy Alvarez  : 2000  MRN: [id number]  Referring provider: Vargas Yoo DO  Dx:   Encounter Diagnosis     ICD-10-CM    1. Acute right-sided thoracic back pain  M54.6       2. Cervical radiculopathy  M54.12           Start Time: 1530  Stop Time: 1600  Total time in clinic (min): 30 minutes    Subjective: Pt reports some soreness following last visit. She was able to go fishing.       Objective: See treatment diary below      Assessment: Tolerated treatment well. Progressed with strengthening to fatigue but no increase in pain.  Patient would benefit from continued PT      Plan: Continue per plan of care.      Precautions: Fall Concern HEP [id number]     Manuals 04/29 05/01 05/06 05/08 05/13 05/15 5/20 05/22  04/26                                                       Neuro Re-Ed             Education and progression    10 min   10 min assessment       Bank Account/Cut Finger             Sitting tolerance education             Scap retraction  10x5\"         10x5\"   Scaption  2x10 2x10 1# 2x10 1# 6x 2#    2x10 1#  3x10 1# 3x10 1# 3 x 10 2# 2x10 2#   2x10    TB row/ext   RTB 2x10  RTB 2x10  RTB 2x10  GTB 2x10  GTB 2x10  GTB 2  x10 BTB 2x10                   Ther Ex             CS Retraction 4x5 4x5 4x5  4x5  4x5 4x5  2 x 5 2x5  4x5    progression 2x5 +ext 3x5 +ext 3x5 +ext 3x5 +ext 3x5 +ext 3x5 +ext 2 x 5 2x5   5x +ext    R SB  3x5   NE            R SB +OP             Supine Retraction           4x5    Progression?              TS ext  4x5  4x5 4x5  4x5 4x5  4x5  4 x 5 4x5  4x5           OP 5 x  OP 2x5                   Ther Activity             CS R SB/Rot and Ext             Shoulder ER strength             TS Flex (post TS Ext)       Better      TS R Rot                           Modalities             HP prn.  Dec   10 min MHP dec dec 10 min MHP dec dec  10 min MHP                    MDT Key:  ANR: Adherent Nerve root  P: Produce  B: Better  NB: No " Better  W: Worse  NW: No worse  NE: No Effect.  D: Decrease  I: Increase  A: Abolish  R/Rep: Repeated  EIS: Ext in Standing  EIL: Ext in Lying  FIS: Flex in standing  FISit: Flex in sitting  SGIS: Side Glide in Standing  SGIP: Side Glide in Prone   Pro: Protrusion  Ret: Retraction  SB: Side Bend  Rot: Rotation  Ext: Extension   PE'd: peripheralized  Pe'g: Peripheralizing  CE'd: centralized  Ce'g: Centralizing

## 2024-05-29 ENCOUNTER — OFFICE VISIT (OUTPATIENT)
Dept: PHYSICAL THERAPY | Facility: CLINIC | Age: 24
End: 2024-05-29
Payer: COMMERCIAL

## 2024-05-29 DIAGNOSIS — M54.12 CERVICAL RADICULOPATHY: ICD-10-CM

## 2024-05-29 DIAGNOSIS — M54.6 ACUTE RIGHT-SIDED THORACIC BACK PAIN: Primary | ICD-10-CM

## 2024-05-29 PROCEDURE — 97110 THERAPEUTIC EXERCISES: CPT

## 2024-05-29 PROCEDURE — 97112 NEUROMUSCULAR REEDUCATION: CPT

## 2024-05-29 NOTE — PROGRESS NOTES
"Daily Note     Today's date: 2024  Patient name: Windy Alvarez  : 2000  MRN: [id number]  Referring provider: Vargas Yoo DO  Dx:   Encounter Diagnosis     ICD-10-CM    1. Acute right-sided thoracic back pain  M54.6       2. Cervical radiculopathy  M54.12           Start Time: 1530  Stop Time: 1602  Total time in clinic (min): 32 minutes    Subjective: Pt reports that she was very sore following last visit's progression.       Objective: See treatment diary below      Assessment: Tolerated treatment well. Decreased intensity to tolerance. Responded well to EIL with thoracic bias. Patient would benefit from continued PT      Plan: Continue per plan of care.      Precautions: Fall Concern HEP [id number]     Manuals 04/29 05/01 05/06 05/08 05/13 05/15 5/20 05/22 05/29                                                        Neuro Re-Ed             Education and progression    10 min   10 min assessment       Bank Account/Cut Finger             Sitting tolerance education             Scap retraction  10x5\"            Scaption  2x10 2x10 1# 2x10 1# 6x 2#    2x10 1#  3x10 1# 3x10 1# 3 x 10 2# 2x10 2#  2x10 1#     TB row/ext   RTB 2x10  RTB 2x10  RTB 2x10  GTB 2x10  GTB 2x10  GTB 2  x10 BTB 2x10  GTB 2x10                  Ther Ex             CS Retraction 4x5 4x5 4x5  4x5  4x5 4x5  2 x 5 2x5 2x5    progression 2x5 +ext 3x5 +ext 3x5 +ext 3x5 +ext 3x5 +ext 3x5 +ext 2 x 5 2x5  2x5     R SB  3x5   NE            R SB +OP             Supine Retraction              Progression?              TS ext  4x5  4x5 4x5  4x5 4x5  4x5  4 x 5 4x5            OP 5 x  OP 2x5  2x10 +OP    EIL thoracic bias          2x10     Ther Activity             CS R SB/Rot and Ext             Shoulder ER strength             TS Flex (post TS Ext)       Better      TS R Rot                           Modalities             HP prn.  Dec   10 min MHP dec dec 10 min MHP dec dec                      MDT Key:  ANR: Adherent Nerve root  P: Produce  B: " Better  NB: No Better  W: Worse  NW: No worse  NE: No Effect.  D: Decrease  I: Increase  A: Abolish  R/Rep: Repeated  EIS: Ext in Standing  EIL: Ext in Lying  FIS: Flex in standing  FISit: Flex in sitting  SGIS: Side Glide in Standing  SGIP: Side Glide in Prone   Pro: Protrusion  Ret: Retraction  SB: Side Bend  Rot: Rotation  Ext: Extension   PE'd: peripheralized  Pe'g: Peripheralizing  CE'd: centralized  Ce'g: Centralizing

## 2024-05-30 ENCOUNTER — TELEPHONE (OUTPATIENT)
Age: 24
End: 2024-05-30

## 2024-05-30 ENCOUNTER — OFFICE VISIT (OUTPATIENT)
Dept: PHYSICAL THERAPY | Facility: CLINIC | Age: 24
End: 2024-05-30
Payer: COMMERCIAL

## 2024-05-30 DIAGNOSIS — M54.12 CERVICAL RADICULOPATHY: ICD-10-CM

## 2024-05-30 DIAGNOSIS — M54.6 ACUTE RIGHT-SIDED THORACIC BACK PAIN: Primary | ICD-10-CM

## 2024-05-30 PROCEDURE — 97110 THERAPEUTIC EXERCISES: CPT | Performed by: PHYSICAL THERAPIST

## 2024-05-30 PROCEDURE — 97112 NEUROMUSCULAR REEDUCATION: CPT | Performed by: PHYSICAL THERAPIST

## 2024-05-30 NOTE — PROGRESS NOTES
Daily Note     Today's date: 2024  Patient name: Windy Alvarez  : 2000  MRN: [id number]  Referring provider: Vargas Yoo DO  Dx:   Encounter Diagnosis     ICD-10-CM    1. Acute right-sided thoracic back pain  M54.6       2. Cervical radiculopathy  M54.12                      Subjective: Pt presents today stating that she is feeling pretty well considering that she was here yesterday.  Reports that there is no soreness or pain during today's session.        Objective: See treatment diary below      Assessment: Able to enhance reps without symptom exacerbation. Introduced to [facility] based intervention without issue as well.  Continue to progress as able.        Plan: Continue per plan of care.      Precautions: Fall Concern HEP [id number]     Manuals 04/29 05/01 05/06 05/08 05/13 05/15 5/20 05/22 05/29 5/30                                                       Neuro Re-Ed             Education and progression    10 min   10 min assessment       Bank Account/Cut Finger             Mini Squat          2 x 10   Hip abd + Ext          2 x 10   Scaption  2x10 2x10 1# 2x10 1# 6x 2#    2x10 1#  3x10 1# 3x10 1# 3 x 10 2# 2x10 2#  2x10 1#  3 x 10 1#   TB row/ext   RTB 2x10  RTB 2x10  RTB 2x10  GTB 2x10  GTB 2x10  GTB 2  x10 BTB 2x10  GTB 2x10  GTB 3 x 10                Ther Ex             CS Retraction 4x5 4x5 4x5  4x5  4x5 4x5  2 x 5 2x5 2x5 2 x 5   progression 2x5 +ext 3x5 +ext 3x5 +ext 3x5 +ext 3x5 +ext 3x5 +ext 2 x 5 2x5  2x5  2 x 5   R SB  3x5   NE            R SB +OP             Supine Retraction              Progression?              TS ext  4x5  4x5 4x5  4x5 4x5  4x5  4 x 5 4x5            OP 5 x  OP 2x5  2x10 +OP 2 x 10+ OP   EIL thoracic bias          2x10  2 x 10   Ther Activity             CS R SB/Rot and Ext             Shoulder ER strength             TS Flex (post TS Ext)       Better      TS R Rot                           Modalities             HP prn.  Dec   10 min MHP dec dec 10 min MHP dec dec                       MDT Key:  ANR: Adherent Nerve root  P: Produce  B: Better  NB: No Better  W: Worse  NW: No worse  NE: No Effect.  D: Decrease  I: Increase  A: Abolish  R/Rep: Repeated  EIS: Ext in Standing  EIL: Ext in Lying  FIS: Flex in standing  FISit: Flex in sitting  SGIS: Side Glide in Standing  SGIP: Side Glide in Prone   Pro: Protrusion  Ret: Retraction  SB: Side Bend  Rot: Rotation  Ext: Extension   PE'd: peripheralized  Pe'g: Peripheralizing  CE'd: centralized  Ce'g: Centralizing

## 2024-05-30 NOTE — TELEPHONE ENCOUNTER
Caller: Valentcecily     Doctor/Office: Fredo    What needs to be faxed: Ovn and work status 5/6    ATTN to: Klaus    Fax#: [phone]      Documents were successfully e-faxed

## 2024-06-09 ENCOUNTER — HOSPITAL ENCOUNTER (OUTPATIENT)
Dept: MRI IMAGING | Facility: HOSPITAL | Age: 24
Discharge: HOME | End: 2024-06-09
Attending: PHYSICAL MEDICINE & REHABILITATION
Payer: COMMERCIAL

## 2024-06-09 DIAGNOSIS — M54.6 ACUTE RIGHT-SIDED THORACIC BACK PAIN: ICD-10-CM

## 2024-06-09 PROCEDURE — 72146 MRI CHEST SPINE W/O DYE: CPT

## 2024-06-12 ENCOUNTER — OFFICE VISIT (OUTPATIENT)
Dept: PHYSICAL THERAPY | Facility: CLINIC | Age: 24
End: 2024-06-12
Payer: COMMERCIAL

## 2024-06-12 DIAGNOSIS — M54.12 CERVICAL RADICULOPATHY: ICD-10-CM

## 2024-06-12 DIAGNOSIS — M54.6 ACUTE RIGHT-SIDED THORACIC BACK PAIN: Primary | ICD-10-CM

## 2024-06-12 PROCEDURE — 97110 THERAPEUTIC EXERCISES: CPT

## 2024-06-12 PROCEDURE — 97112 NEUROMUSCULAR REEDUCATION: CPT

## 2024-06-12 NOTE — PROGRESS NOTES
Daily Note     Today's date: 2024  Patient name: Windy Alvarez  : 2000  MRN: [id number]  Referring provider: Vargas Yoo DO  Dx:   Encounter Diagnosis     ICD-10-CM    1. Acute right-sided thoracic back pain  M54.6       2. Cervical radiculopathy  M54.12                      Subjective: Pt reports some soreness after fishing.       Objective: See treatment diary below      Assessment: Pt tolerated session well. No complaints throughout strengthening.       Plan: Continue per plan of care.      Precautions: Fall Concern HEP [id number]     Manuals 06/12  05/06 05/08 05/13 05/15 5/20 05/22 05/29 5/30                                                       Neuro Re-Ed             Education and progression    10 min   10 min assessment       Bank Account/Cut Finger             Mini Squat 2x10         2 x 10   Hip abd + Ext 2x10          2 x 10   Scaption  2x10 1#  2x10 1# 6x 2#    2x10 1#  3x10 1# 3x10 1# 3 x 10 2# 2x10 2#  2x10 1#  3 x 10 1#   TB row/ext  GTB 2x10  RTB 2x10  RTB 2x10  GTB 2x10  GTB 2x10  GTB 2  x10 BTB 2x10  GTB 2x10  GTB 3 x 10                Ther Ex             CS Retraction 2x5  4x5  4x5  4x5 4x5  2 x 5 2x5 2x5 2 x 5   progression 2x5  3x5 +ext 3x5 +ext 3x5 +ext 3x5 +ext 2 x 5 2x5  2x5  2 x 5   R SB              R SB +OP             Supine Retraction              Progression?              TS ext  2x10 +OP  4x5  4x5 4x5  4x5  4 x 5 4x5            OP 5 x  OP 2x5  2x10 +OP 2 x 10+ OP   EIL thoracic bias  2x10         2x10  2 x 10   Ther Activity             CS R SB/Rot and Ext             Shoulder ER strength             TS Flex (post TS Ext)       Better      TS R Rot                           Modalities             HP prn.    10 min MHP dec dec 10 min MHP dec dec                      MDT Key:  ANR: Adherent Nerve root  P: Produce  B: Better  NB: No Better  W: Worse  NW: No worse  NE: No Effect.  D: Decrease  I: Increase  A: Abolish  R/Rep: Repeated  EIS: Ext in Standing  EIL: Ext in  Lying  FIS: Flex in standing  FISit: Flex in sitting  SGIS: Side Glide in Standing  SGIP: Side Glide in Prone   Pro: Protrusion  Ret: Retraction  SB: Side Bend  Rot: Rotation  Ext: Extension   PE'd: peripheralized  Pe'g: Peripheralizing  CE'd: centralized  Ce'g: Centralizing

## 2024-06-17 ENCOUNTER — OFFICE VISIT (OUTPATIENT)
Dept: PHYSICAL THERAPY | Facility: CLINIC | Age: 24
End: 2024-06-17
Payer: COMMERCIAL

## 2024-06-17 DIAGNOSIS — M54.12 CERVICAL RADICULOPATHY: ICD-10-CM

## 2024-06-17 DIAGNOSIS — M54.6 ACUTE RIGHT-SIDED THORACIC BACK PAIN: Primary | ICD-10-CM

## 2024-06-17 PROCEDURE — 97112 NEUROMUSCULAR REEDUCATION: CPT

## 2024-06-17 PROCEDURE — 97110 THERAPEUTIC EXERCISES: CPT

## 2024-06-17 NOTE — PROGRESS NOTES
Daily Note     Today's date: 2024  Patient name: Windy Alvarez  : 2000  MRN: [id number]  Referring provider: Vargas Yoo DO  Dx:   Encounter Diagnosis     ICD-10-CM    1. Acute right-sided thoracic back pain  M54.6       2. Cervical radiculopathy  M54.12           Start Time: 1530  Stop Time: 1613  Total time in clinic (min): 43 minutes    Subjective: Pt reports that she gets some soreness after fishing, but is doing well.       Objective: See treatment diary below      Assessment: Pt tolerated session well. Responding best to TS ext       Plan: Continue per plan of care.      Precautions: Fall Concern HEP [id number]     Manuals 06/12 06/17  05/08 05/13 05/15 5/20 05/22 05/29 5/30                                                       Neuro Re-Ed             Education and progression    10 min   10 min assessment       Bank Account/Cut Finger             Mini Squat 2x10 2x10        2 x 10   Hip abd + Ext 2x10  2x10        2 x 10   Scaption  2x10 1# 2x10 1#  6x 2#    2x10 1#  3x10 1# 3x10 1# 3 x 10 2# 2x10 2#  2x10 1#  3 x 10 1#   TB row/ext  GTB 2x10 GTB 2x10   RTB 2x10  GTB 2x10  GTB 2x10  GTB 2  x10 BTB 2x10  GTB 2x10  GTB 3 x 10      j          Ther Ex             CS Retraction 2x5 2x5  4x5  4x5 4x5  2 x 5 2x5 2x5 2 x 5   progression 2x5 2x5  3x5 +ext 3x5 +ext 3x5 +ext 2 x 5 2x5  2x5  2 x 5   R SB              R SB +OP             Supine Retraction              Progression?              TS ext  2x10 +OP 2x10 +OP  4x5 4x5  4x5  4 x 5 4x5            OP 5 x  OP 2x5  2x10 +OP 2 x 10+ OP   EIL thoracic bias  2x10  2x10 + blocks       2x10  2 x 10   Ther Activity             CS R SB/Rot and Ext             Shoulder ER strength             TS Flex (post TS Ext)       Better      TS R Rot                           Modalities             HP prn.     dec dec 10 min MHP dec dec                      MDT Key:  ANR: Adherent Nerve root  P: Produce  B: Better  NB: No Better  W: Worse  NW: No worse  NE: No  Effect.  D: Decrease  I: Increase  A: Abolish  R/Rep: Repeated  EIS: Ext in Standing  EIL: Ext in Lying  FIS: Flex in standing  FISit: Flex in sitting  SGIS: Side Glide in Standing  SGIP: Side Glide in Prone   Pro: Protrusion  Ret: Retraction  SB: Side Bend  Rot: Rotation  Ext: Extension   PE'd: peripheralized  Pe'g: Peripheralizing  CE'd: centralized  Ce'g: Centralizing

## 2024-06-20 ENCOUNTER — OFFICE VISIT (OUTPATIENT)
Dept: PHYSICAL THERAPY | Facility: CLINIC | Age: 24
End: 2024-06-20
Payer: COMMERCIAL

## 2024-06-20 DIAGNOSIS — M54.6 ACUTE RIGHT-SIDED THORACIC BACK PAIN: Primary | ICD-10-CM

## 2024-06-20 DIAGNOSIS — M54.12 CERVICAL RADICULOPATHY: ICD-10-CM

## 2024-06-20 PROCEDURE — 97110 THERAPEUTIC EXERCISES: CPT

## 2024-06-20 PROCEDURE — 97112 NEUROMUSCULAR REEDUCATION: CPT

## 2024-06-20 NOTE — PROGRESS NOTES
Daily Note     Today's date: 2024  Patient name: Windy Alvarez  : 2000  MRN: [id number]  Referring provider: Vargas Yoo DO  Dx:   Encounter Diagnosis     ICD-10-CM    1. Acute right-sided thoracic back pain  M54.6       2. Cervical radiculopathy  M54.12           Start Time: 1530  Stop Time: 1605  Total time in clinic (min): 35 minutes    Subjective: Pt reports that she got her MRI results and they didn't show anything.       Objective: See treatment diary below      Assessment: Pt tolerated session well. Progressed with functional strengthening to tolerance     Plan: Continue per plan of care.      Precautions: Fall Concern HEP [id number]     Manuals 06/12 06/17 06/20  05/13 05/15 5/20 05/22 05/29 5/30                                                       Neuro Re-Ed             Education and progression       10 min assessment       Bank Account/Cut Finger             Mini Squat 2x10 2x10 3x10       2 x 10   Hip abd + Ext 2x10  2x10 3x10        2 x 10   Scaption  2x10 1# 2x10 1# 2x10 1#   3x10 1# 3x10 1# 3 x 10 2# 2x10 2#  2x10 1#  3 x 10 1#   TB row/ext  GTB 2x10 GTB 2x10  GTB 2x10   GTB 2x10  GTB 2x10  GTB 2  x10 BTB 2x10  GTB 2x10  GTB 3 x 10                Ther Ex             CS Retraction 2x5 2x5 2x5   4x5 4x5  2 x 5 2x5 2x5 2 x 5   progression 2x5 2x5 2x5   3x5 +ext 3x5 +ext 2 x 5 2x5  2x5  2 x 5   R SB              R SB +OP             Supine Retraction              Progression?              TS ext  2x10 +OP 2x10 +OP 2x10 +OP  4x5  4x5  4 x 5 4x5            OP 5 x  OP 2x5  2x10 +OP 2 x 10+ OP   EIL thoracic bias  2x10  2x10 + blocks 2x10 +blocks       2x10  2 x 10   Ther Activity             CS R SB/Rot and Ext             Shoulder ER strength             TS Flex (post TS Ext)       Better      TS R Rot                           Modalities             HP prn.      dec 10 min MHP dec dec                      MDT Key:  ANR: Adherent Nerve root  P: Produce  B: Better  NB: No Better  W:  Worse  NW: No worse  NE: No Effect.  D: Decrease  I: Increase  A: Abolish  R/Rep: Repeated  EIS: Ext in Standing  EIL: Ext in Lying  FIS: Flex in standing  FISit: Flex in sitting  SGIS: Side Glide in Standing  SGIP: Side Glide in Prone   Pro: Protrusion  Ret: Retraction  SB: Side Bend  Rot: Rotation  Ext: Extension   PE'd: peripheralized  Pe'g: Peripheralizing  CE'd: centralized  Ce'g: Centralizing

## 2024-06-24 ENCOUNTER — EVALUATION (OUTPATIENT)
Dept: PHYSICAL THERAPY | Facility: CLINIC | Age: 24
End: 2024-06-24
Payer: COMMERCIAL

## 2024-06-24 DIAGNOSIS — M54.6 ACUTE RIGHT-SIDED THORACIC BACK PAIN: Primary | ICD-10-CM

## 2024-06-24 DIAGNOSIS — M54.12 CERVICAL RADICULOPATHY: ICD-10-CM

## 2024-06-24 PROCEDURE — 97110 THERAPEUTIC EXERCISES: CPT | Performed by: PHYSICAL THERAPIST

## 2024-06-24 PROCEDURE — 97112 NEUROMUSCULAR REEDUCATION: CPT | Performed by: PHYSICAL THERAPIST

## 2024-06-24 NOTE — PROGRESS NOTES
PT Evaluation     Today's date: 2024  Patient name: Windy Alvarez  : 2000  MRN: [id number]  Referring provider: Vargas Yoo DO  Dx:   Encounter Diagnosis     ICD-10-CM    1. Acute right-sided thoracic back pain  M54.6       2. Cervical radiculopathy  M54.12                            Assessment  Impairments: abnormal or restricted ROM, activity intolerance, impaired balance, impaired physical strength, lacks appropriate home exercise program, pain with function, poor posture , poor body mechanics and endurance    Assessment details: Pt at this time further demonstrates improve CS/TS range, improved tolerance to activity endurance, strength sustained and at this time pt will benefit continued intervention at 1x a week to make sure her current presentation of worst moments continue to improve and overall presentation continues to find improvement with increased activity tolerance, endurance and generalized strengthening.  Primary focus will be to assure end range pain reduction.  Thank you very much for this kind referral.        Understanding of Dx/Px/POC: good     Prognosis: good    Goals  STG 4 Weeks:  Decrease pain at worst to 5 -met  Improve range to improve CS range to min -met  Improve strength to R UE to 4/5.  -met  Independent with HEP -met  LTG 8 Weeks:  Decrease pain at worst to 2 -partial  Improve range to CS range to nil -partial  Improve strength to 5/5.   -partial  Able to perform all desired activities with minimal to nil symptom exacerbation      Plan  Patient would benefit from: skilled physical therapy  Planned modality interventions: thermotherapy: hydrocollator packs and cryotherapy    Planned therapy interventions: joint mobilization, manual therapy, neuromuscular re-education, patient education, postural training, strengthening, stretching, therapeutic activities, therapeutic exercise, therapeutic training, transfer training, home exercise program, IADL retraining, graded motor,  graded exercise, graded activity, functional ROM exercises, flexibility, abdominal trunk stabilization, activity modification, behavior modification and body mechanics training    Frequency: 2x week  Duration in weeks: 10  Treatment plan discussed with: patient        Subjective Evaluation    History of Present Illness  Date of onset: 3/19/2024  Mechanism of injury: Pt presents today stating that she had been doing fairly well over the past few weeks.  Pt reports that she had been feeling so well she performed an acrobatic cartwheel, was okay at that time, very sore the next day it is progressing positive at this time.  Pt reports that otherwise, her endurance is coming back, her strength coming along and is to follow up with Dr. Yoo once they have reviewed information.  Pt reports that at this time to be able to perform various tasks of high level intensity without having symptoms return or stay present.    Quality of life: good    Patient Goals  Patient goals for therapy: decreased pain, increased motion, increased strength and return to sport/leisure activities    Pain  Current pain rating: 3  At best pain ratin  At worst pain ratin  Quality: dull ache, tight and sharp  Relieving factors: change in position, heat and medications  Aggravating factors: lifting and sitting  Progression: improved      Diagnostic Tests  X-ray: normal  Treatments  Previous treatment: medication        Objective     Active Range of Motion   Cervical/Thoracic Spine       Cervical  Subcranial protraction:  WFL   Subcranial retraction:  WFL   Flexion:  WFL  Extension:  with pain Restriction level: minimal  Left lateral flexion:  WFL  Right lateral flexion:  WFL  Left rotation:  WFL  Right rotation:  WFL    Additional Active Range of Motion Details  Forward head, rounded shoulders  B/L Sensation intact to light touch C3,4,5,6,7,8,T1,T2  DTR Bi Tri Brac 1+ B/L  Spear (-) B/L  Postural correction R shoulder blade. Better.    UE  Screen WFL range, Pain with R ER, and 5* R ER.   Repeated motion   Flex  Retraction R shoulder blade. NB.  Ret Pt OP I W.  Back to Retraction NB.  Supine Trial D NB.  Improved CS Range, ER strength.    Ext  SB R  SB L  TS ROM flex* min, Ext min*, Rot L nil, R nil*  SB L nil, R nil.    Joint Mobility  Palpation  STs  NLTT (-) B/L                Precautions: Fall Concern HEP [id number]     Manuals 06/12 06/17 06/20 6/24 05/13 05/15 5/20 05/22 05/29 5/30                                                       Neuro Re-Ed             Education and progression       10 min assessment       Bank Account/Cut Finger             Mini Squat 2x10 2x10 3x10 3 x 10      2 x 10   Hip abd + Ext 2x10  2x10 3x10  3 x 10      2 x 10   Scaption  2x10 1# 2x10 1# 2x10 1#  2 x 10 3x10 1# 3x10 1# 3 x 10 2# 2x10 2#  2x10 1#  3 x 10 1#   TB row/ext  GTB 2x10 GTB 2x10  GTB 2x10  GTB 2 x 10 GTB 2x10  GTB 2x10  GTB 2  x10 BTB 2x10  GTB 2x10  GTB 3 x 10                Ther Ex             CS Retraction 2x5 2x5 2x5  2 x 5 4x5 4x5  2 x 5 2x5 2x5 2 x 5   progression 2x5 2x5 2x5  2 x 5 3x5 +ext 3x5 +ext 2 x 5 2x5  2x5  2 x 5   R SB              R SB +OP             Supine Retraction              Progression?              TS ext  2x10 +OP 2x10 +OP 2x10 +OP 2 x 10 + op 4x5  4x5  4 x 5 4x5            OP 5 x  OP 2x5  2x10 +OP 2 x 10+ OP   EIL thoracic bias  2x10  2x10 + blocks 2x10 +blocks  1 x 10 hold until eye symptoms resolve.       2x10  2 x 10   Ther Activity             CS R SB/Rot and Ext             Shoulder ER strength             TS Flex (post TS Ext)       Better      TS R Rot                           Modalities             HP prn.      dec 10 min MHP dec dec                      MDT Key:  ANR: Adherent Nerve root  P: Produce  B: Better  NB: No Better  W: Worse  NW: No worse  NE: No Effect.  D: Decrease  I: Increase  A: Abolish  R/Rep: Repeated  EIS: Ext in Standing  EIL: Ext in Lying  FIS: Flex in standing  FISit: Flex in sitting  SGIS: Side  [place] in Standing  SGIP: Side Glide in Prone   Pro: Protrusion  Ret: Retraction  SB: Side Bend  Rot: Rotation  Ext: Extension   PE'd: peripheralized  Pe'g: Peripheralizing  CE'd: centralized  Ce'g: Centralizing

## 2024-06-26 ENCOUNTER — OFFICE VISIT (OUTPATIENT)
Dept: PHYSICAL THERAPY | Facility: CLINIC | Age: 24
End: 2024-06-26
Payer: COMMERCIAL

## 2024-06-26 DIAGNOSIS — M54.6 ACUTE RIGHT-SIDED THORACIC BACK PAIN: Primary | ICD-10-CM

## 2024-06-26 DIAGNOSIS — M54.12 CERVICAL RADICULOPATHY: ICD-10-CM

## 2024-06-26 PROCEDURE — 97112 NEUROMUSCULAR REEDUCATION: CPT

## 2024-06-26 PROCEDURE — 97110 THERAPEUTIC EXERCISES: CPT

## 2024-06-26 NOTE — PROGRESS NOTES
Daily Note     Today's date: 2024  Patient name: Windy Alvarez  : 2000  MRN: [id number]  Referring provider: Vargas Yoo DO  Dx:   Encounter Diagnosis     ICD-10-CM    1. Acute right-sided thoracic back pain  M54.6       2. Cervical radiculopathy  M54.12           Start Time: 1530  Stop Time: 1600  Total time in clinic (min): 30 minutes    Subjective: Pt reports that she is feeling about the same today.       Objective: See treatment diary below      Assessment: Tolerated treatment well. Progressed with strengthening to tolerance. Patient would benefit from continued PT      Plan: Continue per plan of care.     recautions: Fall Concern HEP [id number]     Manuals                                                        Neuro Re-Ed             Education and progression       10 min assessment       Bank Account/Cut Finger             Mini Squat 2x10 2x10 3x10 3 x 10 3x10      2 x 10   Hip abd + Ext 2x10  2x10 3x10  3 x 10 RTB 2x10      2 x 10   Scaption  2x10 1# 2x10 1# 2x10 1#  2 x 10 2x10 1#   3 x 10 2# 2x10 2#  2x10 1#  3 x 10 1#   TB row/ext  GTB 2x10 GTB 2x10  GTB 2x10  GTB 2 x 10 GTB 3x10   GTB 2  x10 BTB 2x10  GTB 2x10  GTB 3 x 10   Sidesteps      RTB 4 laps         Ther Ex             CS Retraction 2x5 2x5 2x5  2 x 5 2x5  2 x 5 2x5 2x5 2 x 5   progression 2x5 2x5 2x5  2 x 5 2x5   2 x 5 2x5  2x5  2 x 5   R SB              R SB +OP             Supine Retraction              Progression?              TS ext  2x10 +OP 2x10 +OP 2x10 +OP 2 x 10 + op 2 x 10 + op  4 x 5 4x5            OP 5 x  OP 2x5  2x10 +OP 2 x 10+ OP   EIL thoracic bias  2x10  2x10 + blocks 2x10 +blocks  1 x 10 hold until eye symptoms resolve.   np    2x10  2 x 10   Ther Activity             CS R SB/Rot and Ext             Shoulder ER strength             TS Flex (post TS Ext)       Better      TS R Rot                           Modalities             HP prn.        dec dec                       MDT Key:  ANR: Adherent Nerve root  P: Produce  B: Better  NB: No Better  W: Worse  NW: No worse  NE: No Effect.  D: Decrease  I: Increase  A: Abolish  R/Rep: Repeated  EIS: Ext in Standing  EIL: Ext in Lying  FIS: Flex in standing  FISit: Flex in sitting  SGIS: Side Glide in Standing  SGIP: Side Glide in Prone   Pro: Protrusion  Ret: Retraction  SB: Side Bend  Rot: Rotation  Ext: Extension   PE'd: peripheralized  Pe'g: Peripheralizing  CE'd: centralized  Ce'g: Centralizing

## 2024-07-01 ENCOUNTER — APPOINTMENT (OUTPATIENT)
Dept: PHYSICAL THERAPY | Facility: CLINIC | Age: 24
End: 2024-07-01
Payer: COMMERCIAL

## 2024-07-03 ENCOUNTER — OFFICE VISIT (OUTPATIENT)
Dept: PHYSICAL THERAPY | Facility: CLINIC | Age: 24
End: 2024-07-03
Payer: COMMERCIAL

## 2024-07-03 DIAGNOSIS — M54.6 ACUTE RIGHT-SIDED THORACIC BACK PAIN: Primary | ICD-10-CM

## 2024-07-03 DIAGNOSIS — M54.12 CERVICAL RADICULOPATHY: ICD-10-CM

## 2024-07-03 PROCEDURE — 97112 NEUROMUSCULAR REEDUCATION: CPT

## 2024-07-03 PROCEDURE — 97110 THERAPEUTIC EXERCISES: CPT

## 2024-07-03 NOTE — PROGRESS NOTES
Daily Note     Today's date: 7/3/2024  Patient name: Windy Alvarez  : 2000  MRN: [id number]  Referring provider: Vargas Yoo DO  Dx:   Encounter Diagnosis     ICD-10-CM    1. Acute right-sided thoracic back pain  M54.6       2. Cervical radiculopathy  M54.12           Start Time: 1545  Stop Time: 1615  Total time in clinic (min): 30 minutes    Subjective: Pt reports that she feels about the same, will sometimes get more pain. She believes its from decreasing her HEP performance.       Objective: See treatment diary below      Assessment: Tolerated treatment well.  Pt will increase HEP performance at home. Patient would benefit from continued PT      Plan: Continue per plan of care.     recautions: Fall Concern HEP [id number]     Manuals                                                        Neuro Re-Ed             Education and progression             Bank Account/Cut Finger             Mini Squat 2x10 2x10 3x10 3 x 10 3x10  3x10    2 x 10   Hip abd + Ext 2x10  2x10 3x10  3 x 10 RTB 2x10  RTB 2x10    2 x 10   Scaption  2x10 1# 2x10 1# 2x10 1#  2 x 10 2x10 1#  2x10 1#   2x10 2#  2x10 1#  3 x 10 1#   TB row/ext  GTB 2x10 GTB 2x10  GTB 2x10  GTB 2 x 10 GTB 3x10  GTB 3x10  BTB 2x10  GTB 2x10  GTB 3 x 10   Sidesteps      RTB 4 laps  RTB 4 laps       Ther Ex             CS Retraction 2x5 2x5 2x5  2 x 5 2x5 2x5  2x5 2x5 2 x 5   progression 2x5 2x5 2x5  2 x 5 2x5  2x5  2x5  2x5  2 x 5   R SB              R SB +OP             Supine Retraction              Progression?              TS ext  2x10 +OP 2x10 +OP 2x10 +OP 2 x 10 + op 2 x 10 + op 2x10 + OP  4x5             OP 2x5  2x10 +OP 2 x 10+ OP   EIL thoracic bias  2x10  2x10 + blocks 2x10 +blocks  1 x 10 hold until eye symptoms resolve.   np    2x10  2 x 10   Ther Activity             CS R SB/Rot and Ext             Shoulder ER strength             TS Flex (post TS Ext)             TS R Rot                            Modalities             HP prn.         dec                      MDT Key:  ANR: Adherent Nerve root  P: Produce  B: Better  NB: No Better  W: Worse  NW: No worse  NE: No Effect.  D: Decrease  I: Increase  A: Abolish  R/Rep: Repeated  EIS: Ext in Standing  EIL: Ext in Lying  FIS: Flex in standing  FISit: Flex in sitting  SGIS: Side Glide in Standing  SGIP: Side Glide in Prone   Pro: Protrusion  Ret: Retraction  SB: Side Bend  Rot: Rotation  Ext: Extension   PE'd: peripheralized  Pe'g: Peripheralizing  CE'd: centralized  Ce'g: Centralizing

## 2024-07-08 ENCOUNTER — APPOINTMENT (OUTPATIENT)
Dept: PHYSICAL THERAPY | Facility: CLINIC | Age: 24
End: 2024-07-08
Payer: COMMERCIAL

## 2024-07-10 ENCOUNTER — OFFICE VISIT (OUTPATIENT)
Dept: PHYSICAL THERAPY | Facility: CLINIC | Age: 24
End: 2024-07-10
Payer: COMMERCIAL

## 2024-07-10 DIAGNOSIS — M54.6 ACUTE RIGHT-SIDED THORACIC BACK PAIN: Primary | ICD-10-CM

## 2024-07-10 DIAGNOSIS — M54.12 CERVICAL RADICULOPATHY: ICD-10-CM

## 2024-07-10 PROCEDURE — 97110 THERAPEUTIC EXERCISES: CPT

## 2024-07-10 PROCEDURE — 97112 NEUROMUSCULAR REEDUCATION: CPT

## 2024-07-10 NOTE — PROGRESS NOTES
Daily Note     Today's date: 7/10/2024  Patient name: Windy Alvarez  : 2000  MRN: [id number]  Referring provider: Vargas Yoo DO  Dx:   Encounter Diagnosis     ICD-10-CM    1. Acute right-sided thoracic back pain  M54.6       2. Cervical radiculopathy  M54.12           Start Time: 1530  Stop Time: 1608  Total time in clinic (min): 38 minutes    Subjective: Pt reports that she had a rough few days, unsure of the cause. Is feeling better today.       Objective: See treatment diary below      Assessment: Tolerated treatment well.  Remains challenged with current program Patient would benefit from continued PT      Plan: Continue per plan of care.     recautions: Fall Concern HEP [id number]     Manuals 06/12 06/17 06/20 6/24 06/26 07/03 07/10  05/29 5/30                                                       Neuro Re-Ed             Education and progression             Bank Account/Cut Finger             Mini Squat 2x10 2x10 3x10 3 x 10 3x10  3x10 3x10    2 x 10   Hip abd + Ext 2x10  2x10 3x10  3 x 10 RTB 2x10  RTB 2x10 RTB 2x10   2 x 10   Scaption  2x10 1# 2x10 1# 2x10 1#  2 x 10 2x10 1#  2x10 1#  2x10 1#   2x10 1#  3 x 10 1#   TB row/ext  GTB 2x10 GTB 2x10  GTB 2x10  GTB 2 x 10 GTB 3x10  GTB 3x10 GTB 3x10   GTB 2x10  GTB 3 x 10   Sidesteps      RTB 4 laps  RTB 4 laps RTB 4 laps       Ther Ex             CS Retraction 2x5 2x5 2x5  2 x 5 2x5 2x5 2x5  2x5 2 x 5   progression 2x5 2x5 2x5  2 x 5 2x5  2x5 2x5   2x5  2 x 5   R SB              R SB +OP             Supine Retraction              Progression?              TS ext  2x10 +OP 2x10 +OP 2x10 +OP 2 x 10 + op 2 x 10 + op 2x10 + OP 2x10 + OP               2x10 +OP 2 x 10+ OP   EIL thoracic bias  2x10  2x10 + blocks 2x10 +blocks  1 x 10 hold until eye symptoms resolve.   np    2x10  2 x 10   Ther Activity             CS R SB/Rot and Ext             Shoulder ER strength             TS Flex (post TS Ext)             TS R Rot                           Modalities              KALPANA prn.                               MDT Key:  ANR: Adherent Nerve root  P: Produce  B: Better  NB: No Better  W: Worse  NW: No worse  NE: No Effect.  D: Decrease  I: Increase  A: Abolish  R/Rep: Repeated  EIS: Ext in Standing  EIL: Ext in Lying  FIS: Flex in standing  FISit: Flex in sitting  SGIS: Side Glide in Standing  SGIP: Side Glide in Prone   Pro: Protrusion  Ret: Retraction  SB: Side Bend  Rot: Rotation  Ext: Extension   PE'd: peripheralized  Pe'g: Peripheralizing  CE'd: centralized  Ce'g: Centralizing

## 2024-07-15 ENCOUNTER — APPOINTMENT (OUTPATIENT)
Dept: PHYSICAL THERAPY | Facility: CLINIC | Age: 24
End: 2024-07-15
Payer: COMMERCIAL

## 2024-07-17 ENCOUNTER — TELEPHONE (OUTPATIENT)
Age: 24
End: 2024-07-17

## 2024-07-17 ENCOUNTER — OFFICE VISIT (OUTPATIENT)
Dept: PHYSICAL THERAPY | Facility: CLINIC | Age: 24
End: 2024-07-17
Payer: COMMERCIAL

## 2024-07-17 DIAGNOSIS — M54.12 CERVICAL RADICULOPATHY: ICD-10-CM

## 2024-07-17 DIAGNOSIS — M54.6 ACUTE RIGHT-SIDED THORACIC BACK PAIN: Primary | ICD-10-CM

## 2024-07-17 PROCEDURE — 97112 NEUROMUSCULAR REEDUCATION: CPT | Performed by: PHYSICAL THERAPIST

## 2024-07-17 PROCEDURE — 97110 THERAPEUTIC EXERCISES: CPT | Performed by: PHYSICAL THERAPIST

## 2024-07-17 NOTE — TELEPHONE ENCOUNTER
Caller: [employer]    Doctor: Fredo    Reason for call: Checking to see if paperwork was received. Was faxed yesterday by [employer] . There is nothing scanned in yet. Advised to either fax again or check back in a couple of days. Also made aware of TAT once paperwork is received    Call back#: [phone]   Claim # [id number]

## 2024-07-17 NOTE — PROGRESS NOTES
Daily Note     Today's date: 2024  Patient name: Windy Alvarez  : 2000  MRN: [id number]  Referring provider: Vargas Yoo DO  Dx:   Encounter Diagnosis     ICD-10-CM    1. Acute right-sided thoracic back pain  M54.6       2. Cervical radiculopathy  M54.12                      Subjective: Pt presents today stating that she is doing okay.  States that she feels as though she is either in a static position or slightly trending worse.  States that she feels really well when she performs HEP, but if she doesn't perform than she has a significant amount of pain irritation.        Objective: See treatment diary below      Assessment: Discussed scheduling HEP t/o her day in order to maintain her symptoms to allow her to have more frequent positive days.  RE n.v.       Plan: Continue per plan of care.     recautions: Fall Concern HEP [id number]     Manuals 06/12 06/17 06/20 6/24 06/26 07/03 07/10 7/17 05/29 5/30                                                       Neuro Re-Ed             Education and progression             Bank Account/Cut Finger             Mini Squat 2x10 2x10 3x10 3 x 10 3x10  3x10 3x10  3 x 10 3 x 10 2 x 10   Hip abd + Ext 2x10  2x10 3x10  3 x 10 RTB 2x10  RTB 2x10 RTB 2x10 RTB 2 x 10 RTB 2 x 10 2 x 10   Scaption  2x10 1# 2x10 1# 2x10 1#  2 x 10 2x10 1#  2x10 1#  2x10 1#  2 x 10 1# 2x10 1#  3 x 10 1#   TB row/ext  GTB 2x10 GTB 2x10  GTB 2x10  GTB 2 x 10 GTB 3x10  GTB 3x10 GTB 3x10  GTB 3 x 10 GTB 2x10  GTB 3 x 10   Sidesteps      RTB 4 laps  RTB 4 laps RTB 4 laps  RTB 4 laps RTB 4 Laps    Ther Ex             CS Retraction 2x5 2x5 2x5  2 x 5 2x5 2x5 2x5 2 x 5 2x5 2 x 5   progression 2x5 2x5 2x5  2 x 5 2x5  2x5 2x5  2 x 5 2x5  2 x 5   R SB              R SB +OP             Supine Retraction              Progression?              TS ext  2x10 +OP 2x10 +OP 2x10 +OP 2 x 10 + op 2 x 10 + op 2x10 + OP 2x10 + OP 2 x 10 + OP  2 x 10 OP             2x10 +OP 2 x 10+ OP   EIL thoracic bias  2x10   2x10 + blocks 2x10 +blocks  1 x 10 hold until eye symptoms resolve.   np    2x10  2 x 10   Ther Activity             CS R SB/Rot and Ext             Shoulder ER strength             TS Flex (post TS Ext)             TS R Rot                           Modalities             HP prn.                               MDT Key:  ANR: Adherent Nerve root  P: Produce  B: Better  NB: No Better  W: Worse  NW: No worse  NE: No Effect.  D: Decrease  I: Increase  A: Abolish  R/Rep: Repeated  EIS: Ext in Standing  EIL: Ext in Lying  FIS: Flex in standing  FISit: Flex in sitting  SGIS: Side Glide in Standing  SGIP: Side Glide in Prone   Pro: Protrusion  Ret: Retraction  SB: Side Bend  Rot: Rotation  Ext: Extension   PE'd: peripheralized  Pe'g: Peripheralizing  CE'd: centralized  Ce'g: Centralizing

## 2024-07-22 ENCOUNTER — APPOINTMENT (OUTPATIENT)
Dept: PHYSICAL THERAPY | Facility: CLINIC | Age: 24
End: 2024-07-22
Payer: COMMERCIAL

## 2024-07-24 ENCOUNTER — EVALUATION (OUTPATIENT)
Dept: PHYSICAL THERAPY | Facility: CLINIC | Age: 24
End: 2024-07-24
Payer: COMMERCIAL

## 2024-07-24 DIAGNOSIS — M54.6 ACUTE RIGHT-SIDED THORACIC BACK PAIN: Primary | ICD-10-CM

## 2024-07-24 DIAGNOSIS — M54.12 CERVICAL RADICULOPATHY: ICD-10-CM

## 2024-07-24 PROCEDURE — 97112 NEUROMUSCULAR REEDUCATION: CPT | Performed by: PHYSICAL THERAPIST

## 2024-07-24 NOTE — PROGRESS NOTES
PT Evaluation     Today's date: 2024  Patient name: Windy Alvarez  : 2000  MRN: [id number]  Referring provider: Vargas Yoo DO  Dx:   Encounter Diagnosis     ICD-10-CM    1. Acute right-sided thoracic back pain  M54.6       2. Cervical radiculopathy  M54.12                              Assessment  Impairments: abnormal or restricted ROM, activity intolerance, impaired balance, impaired physical strength, lacks appropriate home exercise program, pain with function, poor posture , poor body mechanics and endurance    Assessment details: Pt at this time demonstrates sustained CS/TS range and strength, and overall improved tolerance to activity and endurance.  She sustained injury in January, has been attending PT intervention since March, best plan moving forward given this recent set back in pain at a constant state as well as elevation of irritation, should be to follow up with Dr. Yoo, possible discussion to have pain management consultation.  Will reach out to Dr. Yoo in regards of this.  Given that she has had a set back and not a positive response to mechanical intervention at this time it is best to hold PT until further evaluation by other medical disciplines.  Pt was in complete accord of this.        Understanding of Dx/Px/POC: good     Prognosis: good    Goals  STG 4 Weeks:  Decrease pain at worst to 5 -met  Improve range to improve CS range to min -met  Improve strength to R UE to 4/5.  -met  Independent with HEP -met  LTG 8 Weeks:  Decrease pain at worst to 2 -not met  Improve range to CS range to nil -partial  Improve strength to 5/5.   -met  Able to perform all desired activities with minimal to nil symptom exacerbation      Plan  Patient would benefit from: skilled physical therapy  Planned modality interventions: thermotherapy: hydrocollator packs and cryotherapy    Planned therapy interventions: joint mobilization, manual therapy, neuromuscular re-education, patient education,  postural training, strengthening, stretching, therapeutic activities, therapeutic exercise, therapeutic training, transfer training, home exercise program, IADL retraining, graded motor, graded exercise, graded activity, functional ROM exercises, flexibility, abdominal trunk stabilization, activity modification, behavior modification and body mechanics training    Duration in weeks: 6  Treatment plan discussed with: patient  Plan details: Hold until further assessment.          Subjective Evaluation    History of Present Illness  Date of onset: 3/19/2024  Mechanism of injury: Pt presents today stating that she is doing okay as a whole.  She is perform usual recreational hobbies, and able to fish for approximately 2.5 hours still sore, but manageable.  Pt reports that she is still utilizing rest breaks.  Pt reports that her pain has become more elevated over the last 2 weeks.  She states that it is a constant state in the middle of her neck and by shoulder blade.  Pt reports that HEP has become more challenging.  She is awakening due to the symptoms.  Pt reports that she has attempted to call physicians office for formal follow up and has not received this at this time.  Pt reports that she is having difficulty reaching and lifting and preforming all tasks from her last RE which was on 24  Quality of life: good    Patient Goals  Patient goals for therapy: decreased pain, increased motion, increased strength and return to sport/leisure activities    Pain  Current pain ratin  At best pain ratin  At worst pain ratin  Quality: dull ache, tight and sharp  Relieving factors: change in position, heat and medications  Aggravating factors: lifting and sitting  Progression: improved      Diagnostic Tests  X-ray: normal  Treatments  Previous treatment: medication        Objective     Active Range of Motion   Cervical/Thoracic Spine       Cervical  Subcranial protraction:  WFL   Subcranial retraction:  WFL    Flexion:  WFL  Extension:  with pain Restriction level: minimal  Left lateral flexion:  WFL  Right lateral flexion:  WFL  Left rotation:  WFL  Right rotation:  WFL    Thoracic    Flexion:  WFL  Extension:  Restriction level: minimal  Left lateral flexion:  WFL  Right lateral flexion:  WFL  Left rotation:  WFL  Right rotation:  WFL    Additional Active Range of Motion Details  Forward head, rounded shoulders  B/L Sensation intact to light touch C3,4,5,6,7,8,T1,T2  DTR Bi Tri Brac 1+ B/L  Spear (-) B/L  Postural correction R shoulder blade. Better.    UE Screen WFL range, Pain with R ER, and 5* R ER.   Repeated motion   Flex  Retraction R shoulder blade. NB.  Ret Pt OP I W.  Back to Retraction NB.  Supine Trial D NB.  Improved CS Range, ER strength.    (Today NE).   Ext  SB R  SB L  TS ROM flex* min, Ext min*, Rot L nil, R nil*  SB L nil, R nil.    Joint Mobility  Palpation  STs  NLTT (-) B/L                recautions: Fall Concern HEP [id number]     Manuals 06/12 06/17 06/20 6/24 06/26 07/03 07/10 7/17 7/24 5/30                                                       Neuro Re-Ed             Education and progression         Assessment x 15    Bank Account/Cut Finger             Mini Squat 2x10 2x10 3x10 3 x 10 3x10  3x10 3x10  3 x 10  2 x 10   Hip abd + Ext 2x10  2x10 3x10  3 x 10 RTB 2x10  RTB 2x10 RTB 2x10 RTB 2 x 10  2 x 10   Scaption  2x10 1# 2x10 1# 2x10 1#  2 x 10 2x10 1#  2x10 1#  2x10 1#  2 x 10 1#  3 x 10 1#   TB row/ext  GTB 2x10 GTB 2x10  GTB 2x10  GTB 2 x 10 GTB 3x10  GTB 3x10 GTB 3x10  GTB 3 x 10  GTB 3 x 10   Sidesteps      RTB 4 laps  RTB 4 laps RTB 4 laps  RTB 4 laps     Ther Ex             CS Retraction 2x5 2x5 2x5  2 x 5 2x5 2x5 2x5 2 x 5 review 2 x 5   progression 2x5 2x5 2x5  2 x 5 2x5  2x5 2x5  2 x 5 review 2 x 5   R SB              R SB +OP             Supine Retraction              Progression?              TS ext  2x10 +OP 2x10 +OP 2x10 +OP 2 x 10 + op 2 x 10 + op 2x10 + OP 2x10 + OP 2 x 10 +  OP  review              2 x 10+ OP   EIL thoracic bias  2x10  2x10 + blocks 2x10 +blocks  1 x 10 hold until eye symptoms resolve.   np     2 x 10   Ther Activity             CS R SB/Rot and Ext             Shoulder ER strength             TS Flex (post TS Ext)             TS R Rot                           Modalities             HP prn.                               MDT Key:  ANR: Adherent Nerve root  P: Produce  B: Better  NB: No Better  W: Worse  NW: No worse  NE: No Effect.  D: Decrease  I: Increase  A: Abolish  R/Rep: Repeated  EIS: Ext in Standing  EIL: Ext in Lying  FIS: Flex in standing  FISit: Flex in sitting  SGIS: Side Glide in Standing  SGIP: Side Glide in Prone   Pro: Protrusion  Ret: Retraction  SB: Side Bend  Rot: Rotation  Ext: Extension   PE'd: peripheralized  Pe'g: Peripheralizing  CE'd: centralized  Ce'g: Centralizing

## 2024-09-14 ENCOUNTER — OFFICE VISIT (OUTPATIENT)
Dept: OBGYN CLINIC | Facility: CLINIC | Age: 24
End: 2024-09-14
Payer: COMMERCIAL

## 2024-09-14 VITALS
BODY MASS INDEX: 23.92 KG/M2 | SYSTOLIC BLOOD PRESSURE: 115 MMHG | HEART RATE: 69 BPM | WEIGHT: 130 LBS | HEIGHT: 62 IN | DIASTOLIC BLOOD PRESSURE: 76 MMHG

## 2024-09-14 DIAGNOSIS — M79.18 BILATERAL MYOFASCIAL PAIN: Primary | ICD-10-CM

## 2024-09-14 PROCEDURE — 99214 OFFICE O/P EST MOD 30 MIN: CPT | Performed by: PHYSICAL MEDICINE & REHABILITATION

## 2024-09-14 RX ORDER — PREDNISONE 20 MG/1
40 TABLET ORAL
Qty: 10 TABLET | Refills: 0 | Status: SHIPPED | OUTPATIENT
Start: 2024-09-14 | End: 2024-09-19
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
## 2024-10-09 ENCOUNTER — CONSULT (OUTPATIENT)
Dept: PAIN MEDICINE | Facility: CLINIC | Age: 24
End: 2024-10-09
Payer: COMMERCIAL

## 2024-10-09 VITALS
BODY MASS INDEX: 23.92 KG/M2 | SYSTOLIC BLOOD PRESSURE: 108 MMHG | HEART RATE: 86 BPM | HEIGHT: 62 IN | WEIGHT: 130 LBS | DIASTOLIC BLOOD PRESSURE: 76 MMHG
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
## 2024-10-09 DIAGNOSIS — M54.12 CERVICAL RADICULOPATHY: Primary | ICD-10-CM

## 2024-10-09 DIAGNOSIS — M79.18 BILATERAL MYOFASCIAL PAIN: ICD-10-CM

## 2024-10-09 PROCEDURE — 99243 OFF/OP CNSLTJ NEW/EST LOW 30: CPT | Performed by: PAIN MEDICINE
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
## 2024-10-17 ENCOUNTER — HOSPITAL ENCOUNTER (OUTPATIENT)
Dept: RADIOLOGY | Facility: HOSPITAL | Age: 24
End: 2024-10-17
Payer: COMMERCIAL

## 2024-10-17 DIAGNOSIS — M54.12 CERVICAL RADICULOPATHY: ICD-10-CM
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
## 2024-10-17 PROCEDURE — 72050 X-RAY EXAM NECK SPINE 4/5VWS: CPT

## 2024-10-18 ENCOUNTER — TELEPHONE (OUTPATIENT)
Dept: PAIN MEDICINE | Facility: CLINIC | Age: 24
End: 2024-10-18

## 2024-10-22 ENCOUNTER — TELEPHONE (OUTPATIENT)
Age: 24
End: 2024-10-22

## 2024-10-22 NOTE — TELEPHONE ENCOUNTER
Caller: Windy    Doctor: Fredo    Reason for call: Looking PT records. Gave patient the number and transferred to PT    Call back#: [phone]

## 2024-10-25 ENCOUNTER — HOSPITAL ENCOUNTER (OUTPATIENT)
Dept: MRI IMAGING | Facility: HOSPITAL | Age: 24
End: 2024-10-25
Attending: PAIN MEDICINE
Payer: COMMERCIAL
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
## 2024-10-25 DIAGNOSIS — M54.12 CERVICAL RADICULOPATHY: ICD-10-CM

## 2024-10-25 PROCEDURE — 72141 MRI NECK SPINE W/O DYE: CPT

## 2024-10-28 ENCOUNTER — TELEPHONE (OUTPATIENT)
Dept: PAIN MEDICINE | Facility: CLINIC | Age: 24
End: 2024-10-28

## 2024-10-28 NOTE — TELEPHONE ENCOUNTER
----- Message from Juan Medina MD sent at 10/28/2024  2:16 PM EDT -----  Midl disc disease at C56 lets discuss in the office next steps

## 2024-10-30 ENCOUNTER — OFFICE VISIT (OUTPATIENT)
Dept: PAIN MEDICINE | Facility: CLINIC | Age: 24
End: 2024-10-30
Payer: COMMERCIAL

## 2024-10-30 ENCOUNTER — TELEPHONE (OUTPATIENT)
Dept: OBGYN CLINIC | Facility: CLINIC | Age: 24
End: 2024-10-30

## 2024-10-30 VITALS
WEIGHT: 130 LBS | DIASTOLIC BLOOD PRESSURE: 76 MMHG | BODY MASS INDEX: 23.92 KG/M2 | HEART RATE: 80 BPM | SYSTOLIC BLOOD PRESSURE: 124 MMHG | HEIGHT: 62 IN
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
## 2024-10-30 DIAGNOSIS — R22.0 SWELLING, MASS, OR LUMP ON FACE: ICD-10-CM

## 2024-10-30 DIAGNOSIS — M47.812 CERVICAL SPONDYLOSIS: Primary | ICD-10-CM

## 2024-10-30 DIAGNOSIS — M79.18 MYOFASCIAL PAIN SYNDROME: ICD-10-CM

## 2024-10-30 PROCEDURE — 99214 OFFICE O/P EST MOD 30 MIN: CPT | Performed by: PAIN MEDICINE

## 2024-10-30 RX ORDER — METHOCARBAMOL 500 MG/1
500 TABLET, FILM COATED ORAL 3 TIMES DAILY
Qty: 90 TABLET | Refills: 1 | Status: SHIPPED | OUTPATIENT
Start: 2024-10-30
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
## 2024-12-04 ENCOUNTER — TELEPHONE (OUTPATIENT)
Age: 24
End: 2024-12-04

## 2024-12-04 NOTE — TELEPHONE ENCOUNTER
"Pt called for instructions before her MBB tomorrow.  I reached out to the procedure  and pt was advised, \"no eating or drinking one hr prior and nothing for pain 6 hours prior\"  "

## 2024-12-05 ENCOUNTER — HOSPITAL ENCOUNTER (OUTPATIENT)
Dept: RADIOLOGY | Facility: HOSPITAL | Age: 24
Discharge: HOME | End: 2024-12-05
Attending: PAIN MEDICINE
Payer: COMMERCIAL

## 2024-12-05 VITALS
DIASTOLIC BLOOD PRESSURE: 81 MMHG | HEART RATE: 74 BPM | SYSTOLIC BLOOD PRESSURE: 136 MMHG | RESPIRATION RATE: 17 BRPM | TEMPERATURE: 97.9 F | OXYGEN SATURATION: 99 %

## 2024-12-05 DIAGNOSIS — M79.18 MYOFASCIAL PAIN SYNDROME: ICD-10-CM

## 2024-12-05 DIAGNOSIS — M47.812 CERVICAL SPONDYLOSIS: ICD-10-CM

## 2024-12-05 PROCEDURE — 64490 INJ PARAVERT F JNT C/T 1 LEV: CPT | Performed by: PAIN MEDICINE

## 2024-12-05 PROCEDURE — 64491 INJ PARAVERT F JNT C/T 2 LEV: CPT | Performed by: PAIN MEDICINE

## 2024-12-05 RX ORDER — BUPIVACAINE HCL/PF 2.5 MG/ML
1.5 VIAL (ML) INJECTION ONCE
Status: COMPLETED | OUTPATIENT
Start: 2024-12-05 | End: 2024-12-05

## 2024-12-05 RX ADMIN — BUPIVACAINE HYDROCHLORIDE 1.5 ML: 2.5 INJECTION, SOLUTION EPIDURAL; INFILTRATION; INTRACAUDAL at 10:19

## 2024-12-05 NOTE — DISCHARGE INSTR - LAB

## 2024-12-05 NOTE — H&P
History of Present Illness: The patient is a 24 y.o. female who presents with complaints of right neck pain.    No past medical history on file.    No past surgical history on file.      Current Outpatient Medications:     diclofenac sodium (VOLTAREN) 50 mg EC tablet, Take 1 tablet (50 mg total) by mouth 2 (two) times a day, Disp: 60 tablet, Rfl: 1    methocarbamol (ROBAXIN) 500 mg tablet, Take 1 tablet (500 mg total) by mouth 3 (three) times a day, Disp: 90 tablet, Rfl: 1    naproxen (NAPROSYN) 500 mg tablet, Take 1 tablet (500 mg total) by mouth 2 (two) times a day as needed for moderate pain (Patient not taking: Reported on 10/9/2024), Disp: 60 tablet, Rfl: 0    No Known Allergies    Physical Exam: There were no vitals filed for this visit.  General: Awake, Alert, Oriented x 3, Mood and affect appropriate  Respiratory: Respirations even and unlabored  Cardiovascular: Peripheral pulses intact; no edema  Musculoskeletal Exam: TTP right facet cervical    ASA Score: 1    Patient/Chart Verification  Patient ID Verified: Verbal  ID Band Applied: No  Consents Confirmed: Procedural  H&P( within 30 days) Verified: To be obtained in the Procedural area  Allergies Reviewed: Yes  Anticoag/NSAID held?: NA  Currently on antibiotics?: No    Assessment:   1. Cervical spondylosis    2. Myofascial pain syndrome        Plan: Right C345 MBB #1    The risks of the procedure were discussed in detail.  These risks include infection, increased pain, paralysis, bleeding.  Patient understands the risks and is willing to pursue with the procedure

## 2024-12-06 DIAGNOSIS — M47.812 CERVICAL SPONDYLOSIS: Primary | ICD-10-CM

## 2024-12-06 NOTE — PROGRESS NOTES
80% relief after right C345 MBB # 1  Will proceed with MBB # 2.    The risks of the procedure were discussed in detail.  These risks include infection, increased pain, paralysis, bleeding.  Patient understands the risks and is willing to pursue with the procedure

## 2024-12-11 ENCOUNTER — TELEPHONE (OUTPATIENT)
Age: 24
End: 2024-12-11

## 2025-01-17 ENCOUNTER — HOSPITAL ENCOUNTER (OUTPATIENT)
Dept: RADIOLOGY | Facility: HOSPITAL | Age: 25
Discharge: HOME | End: 2025-01-17
Attending: PAIN MEDICINE
Payer: COMMERCIAL

## 2025-01-17 VITALS
DIASTOLIC BLOOD PRESSURE: 76 MMHG | RESPIRATION RATE: 17 BRPM | SYSTOLIC BLOOD PRESSURE: 131 MMHG | HEART RATE: 84 BPM | OXYGEN SATURATION: 99 % | TEMPERATURE: 97.7 F

## 2025-01-17 DIAGNOSIS — M47.812 CERVICAL SPONDYLOSIS: ICD-10-CM

## 2025-01-17 PROCEDURE — 64491 INJ PARAVERT F JNT C/T 2 LEV: CPT | Performed by: PAIN MEDICINE

## 2025-01-17 PROCEDURE — 64490 INJ PARAVERT F JNT C/T 1 LEV: CPT | Performed by: PAIN MEDICINE

## 2025-01-17 RX ORDER — BUPIVACAINE HYDROCHLORIDE 5 MG/ML
1.5 INJECTION, SOLUTION EPIDURAL; INTRACAUDAL ONCE
Status: COMPLETED | OUTPATIENT
Start: 2025-01-17 | End: 2025-01-17

## 2025-01-17 RX ADMIN — BUPIVACAINE HYDROCHLORIDE 1.5 ML: 5 INJECTION, SOLUTION EPIDURAL; INTRACAUDAL; PERINEURAL at 10:58

## 2025-01-17 NOTE — H&P
History of Present Illness: The patient is a 24 y.o. female who presents with complaints of right neck pain    No past medical history on file.    No past surgical history on file.      Current Outpatient Medications:     diclofenac sodium (VOLTAREN) 50 mg EC tablet, Take 1 tablet (50 mg total) by mouth 2 (two) times a day, Disp: 60 tablet, Rfl: 1    methocarbamol (ROBAXIN) 500 mg tablet, Take 1 tablet (500 mg total) by mouth 3 (three) times a day, Disp: 90 tablet, Rfl: 1    naproxen (NAPROSYN) 500 mg tablet, Take 1 tablet (500 mg total) by mouth 2 (two) times a day as needed for moderate pain (Patient not taking: Reported on 10/9/2024), Disp: 60 tablet, Rfl: 0    No Known Allergies    Physical Exam: There were no vitals filed for this visit.  General: Awake, Alert, Oriented x 3, Mood and affect appropriate  Respiratory: Respirations even and unlabored  Cardiovascular: Peripheral pulses intact; no edema  Musculoskeletal Exam: TTP right  neck    ASA Score: 1         Assessment: No diagnosis found.    Plan: right C345 MBB # 2      The risks of the procedure were discussed in detail.  These risks include infection, increased pain, paralysis, bleeding.  Patient understands the risks and is willing to pursue with the procedure

## 2025-01-17 NOTE — DISCHARGE INSTR - LAB

## 2025-02-12 DIAGNOSIS — M47.812 CERVICAL SPONDYLOSIS: Primary | ICD-10-CM

## 2025-02-12 NOTE — PROGRESS NOTES
Sp 2 MBB Right C345 with 80% relief, will proceed with ablation.    The risks of the procedure were discussed in detail.  These risks include infection, increased pain, paralysis, bleeding.  Patient understands the risks and is willing to pursue with the procedure

## 2025-02-13 ENCOUNTER — TELEPHONE (OUTPATIENT)
Dept: PAIN MEDICINE | Facility: CLINIC | Age: 25
End: 2025-02-13

## 2025-03-13 ENCOUNTER — TELEPHONE (OUTPATIENT)
Age: 25
End: 2025-03-13

## 2025-03-13 NOTE — TELEPHONE ENCOUNTER
Caller: Dylan - [facility] [employer]     Doctor: Dr. RESENDEZ    Reason for call: Calling to see if we received their MRO request. Wanted to know if we keep records at our location.      Call back#: [phone]

## 2025-03-14 ENCOUNTER — TELEPHONE (OUTPATIENT)
Dept: PAIN MEDICINE | Facility: CLINIC | Age: 25
End: 2025-03-14

## 2025-03-14 ENCOUNTER — HOSPITAL ENCOUNTER (OUTPATIENT)
Dept: RADIOLOGY | Facility: HOSPITAL | Age: 25
Discharge: HOME | End: 2025-03-14
Attending: PAIN MEDICINE
Payer: COMMERCIAL

## 2025-03-14 VITALS
SYSTOLIC BLOOD PRESSURE: 132 MMHG | RESPIRATION RATE: 18 BRPM | HEART RATE: 83 BPM | TEMPERATURE: 98.1 F | DIASTOLIC BLOOD PRESSURE: 78 MMHG | OXYGEN SATURATION: 99 %

## 2025-03-14 DIAGNOSIS — M47.812 CERVICAL SPONDYLOSIS: ICD-10-CM

## 2025-03-14 PROCEDURE — 64633 DESTROY CERV/THOR FACET JNT: CPT | Performed by: PAIN MEDICINE

## 2025-03-14 PROCEDURE — 64634 DESTROY C/TH FACET JNT ADDL: CPT | Performed by: PAIN MEDICINE

## 2025-03-14 RX ORDER — PAPAVERINE HCL 150 MG
10 CAPSULE, EXTENDED RELEASE ORAL ONCE
Status: COMPLETED | OUTPATIENT
Start: 2025-03-14 | End: 2025-03-14

## 2025-03-14 RX ORDER — BUPIVACAINE HCL/PF 2.5 MG/ML
3 VIAL (ML) INJECTION ONCE
Status: COMPLETED | OUTPATIENT
Start: 2025-03-14 | End: 2025-03-14

## 2025-03-14 RX ORDER — LIDOCAINE HYDROCHLORIDE 10 MG/ML
15 INJECTION, SOLUTION EPIDURAL; INFILTRATION; INTRACAUDAL; PERINEURAL ONCE
Status: COMPLETED | OUTPATIENT
Start: 2025-03-14 | End: 2025-03-14

## 2025-03-14 RX ADMIN — DEXAMETHASONE SODIUM PHOSPHATE 10 MG: 10 INJECTION, SOLUTION INTRAMUSCULAR; INTRAVENOUS at 11:50

## 2025-03-14 RX ADMIN — Medication 3 ML: at 11:50

## 2025-03-14 RX ADMIN — LIDOCAINE HYDROCHLORIDE 10 ML: 10 INJECTION, SOLUTION EPIDURAL; INFILTRATION; INTRACAUDAL; PERINEURAL at 11:50

## 2025-03-14 RX ADMIN — BUPIVACAINE HYDROCHLORIDE 3 ML: 2.5 INJECTION, SOLUTION EPIDURAL; INFILTRATION; INTRACAUDAL at 11:50

## 2025-03-14 NOTE — DISCHARGE INSTR - LAB

## 2025-03-14 NOTE — H&P
History of Present Illness: The patient is a 24 y.o. female who presents with complaints of right side of neck    No past medical history on file.    No past surgical history on file.      Current Outpatient Medications:     diclofenac sodium (VOLTAREN) 50 mg EC tablet, Take 1 tablet (50 mg total) by mouth 2 (two) times a day, Disp: 60 tablet, Rfl: 1    methocarbamol (ROBAXIN) 500 mg tablet, Take 1 tablet (500 mg total) by mouth 3 (three) times a day, Disp: 90 tablet, Rfl: 1    naproxen (NAPROSYN) 500 mg tablet, Take 1 tablet (500 mg total) by mouth 2 (two) times a day as needed for moderate pain (Patient not taking: Reported on 10/9/2024), Disp: 60 tablet, Rfl: 0    No Known Allergies    Physical Exam: There were no vitals filed for this visit.  General: Awake, Alert, Oriented x 3, Mood and affect appropriate  Respiratory: Respirations even and unlabored  Cardiovascular: Peripheral pulses intact; no edema  Musculoskeletal Exam:TTP right side of neck    ASA Score: 1         Assessment:   1. Cervical spondylosis        Plan: right C345 MBB ablation    The risks of the procedure were discussed in detail.  These risks include infection, increased pain, paralysis, bleeding.  Patient understands the risks and is willing to pursue with the procedure

## 2025-03-17 DIAGNOSIS — M79.18 BILATERAL MYOFASCIAL PAIN: ICD-10-CM

## 2025-03-17 DIAGNOSIS — M54.12 CERVICAL RADICULOPATHY: ICD-10-CM
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
## 2025-03-18 ENCOUNTER — TELEPHONE (OUTPATIENT)
Age: 25
End: 2025-03-18

## 2025-03-18 NOTE — TELEPHONE ENCOUNTER
Caller: Derian with [facility] [employer]    Doctor: Dr. Yoo    Reason for call: Calling to confirm fax number. MRO phone number and office address. Also calling to confirm we received MRO request. No further questions.

## 2025-03-21 DIAGNOSIS — M47.812 CERVICAL SPONDYLOSIS: Primary | ICD-10-CM

## 2025-03-21 RX ORDER — METHYLPREDNISOLONE 4 MG/1
TABLET ORAL
Qty: 1 EACH | Refills: 0 | Status: SHIPPED | OUTPATIENT
Start: 2025-03-21

## 2025-03-21 RX ORDER — GABAPENTIN 300 MG/1
300 CAPSULE ORAL 3 TIMES DAILY
Qty: 90 CAPSULE | Refills: 1 | Status: SHIPPED | OUTPATIENT
Start: 2025-03-21

## 2025-03-21 NOTE — PROGRESS NOTES
S/p cervical ablation 3/14/25 with tighness and difficulty sleeping at night  Recommend medrol dose pack and gbaapentin 300 mg tid

## 2025-03-27 ENCOUNTER — TELEPHONE (OUTPATIENT)
Age: 25
End: 2025-03-27

## 2025-04-18 ENCOUNTER — PATIENT MESSAGE (OUTPATIENT)
Dept: PAIN MEDICINE | Facility: CLINIC | Age: 25
End: 2025-04-18

## 2025-04-23 ENCOUNTER — OFFICE VISIT (OUTPATIENT)
Dept: PAIN MEDICINE | Facility: CLINIC | Age: 25
End: 2025-04-23
Payer: COMMERCIAL

## 2025-04-23 DIAGNOSIS — M79.18 MYOFASCIAL PAIN SYNDROME: Primary | ICD-10-CM

## 2025-04-23 DIAGNOSIS — M47.812 CERVICAL SPONDYLOSIS: ICD-10-CM

## 2025-04-23 DIAGNOSIS — M25.511 ACUTE PAIN OF RIGHT SHOULDER: ICD-10-CM

## 2025-04-23 DIAGNOSIS — M54.12 CERVICAL RADICULOPATHY: ICD-10-CM

## 2025-04-23 PROCEDURE — 99213 OFFICE O/P EST LOW 20 MIN: CPT | Performed by: PAIN MEDICINE
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
## 2025-04-25 DIAGNOSIS — M25.511 CHRONIC RIGHT SHOULDER PAIN: Primary | ICD-10-CM

## 2025-04-25 DIAGNOSIS — G89.29 CHRONIC RIGHT SHOULDER PAIN: Primary | ICD-10-CM

## 2025-05-07 ENCOUNTER — HOSPITAL ENCOUNTER (OUTPATIENT)
Dept: MRI IMAGING | Facility: HOSPITAL | Age: 25
End: 2025-05-07
Attending: PAIN MEDICINE
Payer: COMMERCIAL

## 2025-05-07 ENCOUNTER — HOSPITAL ENCOUNTER (OUTPATIENT)
Dept: RADIOLOGY | Facility: HOSPITAL | Age: 25
Discharge: HOME | End: 2025-05-07
Attending: PAIN MEDICINE
Payer: COMMERCIAL

## 2025-05-07 ENCOUNTER — HOSPITAL ENCOUNTER (OUTPATIENT)
Dept: MRI IMAGING | Facility: HOSPITAL | Age: 25
Discharge: HOME | End: 2025-05-07
Attending: PAIN MEDICINE
Payer: COMMERCIAL

## 2025-05-07 DIAGNOSIS — G89.29 CHRONIC RIGHT SHOULDER PAIN: ICD-10-CM

## 2025-05-07 DIAGNOSIS — M25.511 CHRONIC RIGHT SHOULDER PAIN: ICD-10-CM

## 2025-05-07 DIAGNOSIS — M54.12 CERVICAL RADICULOPATHY: ICD-10-CM

## 2025-05-07 PROCEDURE — 73030 X-RAY EXAM OF SHOULDER: CPT

## 2025-05-07 PROCEDURE — 72141 MRI NECK SPINE W/O DYE: CPT

## 2025-05-08 ENCOUNTER — RESULTS FOLLOW-UP (OUTPATIENT)
Dept: RADIOLOGY | Facility: HOSPITAL | Age: 25
End: 2025-05-08

## 2025-05-08 DIAGNOSIS — M25.511 CHRONIC RIGHT SHOULDER PAIN: Primary | ICD-10-CM

## 2025-05-08 DIAGNOSIS — G89.29 CHRONIC RIGHT SHOULDER PAIN: Primary | ICD-10-CM

## 2025-05-08 NOTE — PROGRESS NOTES
Patient completed x ray Right shoulder, no improvement after targeted therapy, 6 weeks in the 6 months, will recommend MRI Right shoulder

## 2025-05-25 ENCOUNTER — HOSPITAL ENCOUNTER (OUTPATIENT)
Dept: MRI IMAGING | Facility: HOSPITAL | Age: 25
Discharge: HOME | End: 2025-05-25
Payer: COMMERCIAL

## 2025-05-25 DIAGNOSIS — M25.511 CHRONIC RIGHT SHOULDER PAIN: ICD-10-CM

## 2025-05-25 DIAGNOSIS — G89.29 CHRONIC RIGHT SHOULDER PAIN: ICD-10-CM

## 2025-05-25 PROCEDURE — 73221 MRI JOINT UPR EXTREM W/O DYE: CPT

## 2025-05-28 ENCOUNTER — OFFICE VISIT (OUTPATIENT)
Dept: PAIN MEDICINE | Facility: CLINIC | Age: 25
End: 2025-05-28
Payer: COMMERCIAL

## 2025-05-28 DIAGNOSIS — M47.812 CERVICAL SPONDYLOSIS: ICD-10-CM

## 2025-05-28 DIAGNOSIS — M79.18 MYOFASCIAL PAIN SYNDROME: Primary | ICD-10-CM

## 2025-05-28 PROCEDURE — 20552 NJX 1/MLT TRIGGER POINT 1/2: CPT | Performed by: PAIN MEDICINE

## 2025-05-28 PROCEDURE — 99214 OFFICE O/P EST MOD 30 MIN: CPT | Performed by: PAIN MEDICINE

## 2025-05-28 RX ORDER — BUPIVACAINE HYDROCHLORIDE 2.5 MG/ML
5 INJECTION, SOLUTION INFILTRATION; PERINEURAL
Status: COMPLETED | OUTPATIENT
Start: 2025-05-28 | End: 2025-05-28

## 2025-05-28 RX ORDER — BACLOFEN 10 MG/1
5 TABLET ORAL 3 TIMES DAILY
Qty: 42 TABLET | Refills: 1 | Status: SHIPPED | OUTPATIENT
Start: 2025-05-28

## 2025-05-28 RX ORDER — TRIAMCINOLONE ACETONIDE 40 MG/ML
20 INJECTION, SUSPENSION INTRA-ARTICULAR; INTRAMUSCULAR
Status: COMPLETED | OUTPATIENT
Start: 2025-05-28 | End: 2025-05-28

## 2025-05-28 RX ADMIN — BUPIVACAINE HYDROCHLORIDE 5 ML: 2.5 INJECTION, SOLUTION INFILTRATION; PERINEURAL at 15:30

## 2025-05-28 RX ADMIN — TRIAMCINOLONE ACETONIDE 20 MG: 40 INJECTION, SUSPENSION INTRA-ARTICULAR; INTRAMUSCULAR at 15:30

## 2025-05-28 NOTE — ASSESSMENT & PLAN NOTE
Status post right C3-4-5 medial branch ablation with improvement in her right neck pain symptoms  Orders:  •  baclofen 10 mg tablet; Take 0.5 tablets (5 mg total) by mouth 3 (three) times a day  EMG pending

## 2025-05-28 NOTE — PROGRESS NOTES
Name: Windy Alvarez      : 2000      MRN: [id number]  Encounter Provider: Juan Medina MD  Encounter Date: 2025   Encounter department: [facility]  :  Assessment & Plan  Myofascial pain syndrome  SHe is frankly tender in the right trapezius region will provide a trigger point injection today    Follow-up in 4 weeks will start baclofen 5 mg 3 times daily for muscle pains  Orders:  •  Inj Trigger Point Single/Multiple  •  baclofen 10 mg tablet; Take 0.5 tablets (5 mg total) by mouth 3 (three) times a day  •  bupivacaine (MARCAINE) 0.25 % injection 5 mL  •  triamcinolone acetonide (Kenalog-40) 40 mg/mL injection 20 mg    Cervical spondylosis  Status post right C3-4-5 medial branch ablation with improvement in her right neck pain symptoms  Orders:  •  baclofen 10 mg tablet; Take 0.5 tablets (5 mg total) by mouth 3 (three) times a day  EMG pending          My impressions and treatment recommendations were discussed in detail with the patient who verbalized understanding and had no further questions.  Discharge instructions were provided. I personally saw and examined the patient and I agree with the above discussed plan of care.    History of Present Illness     Windy Alvarez is a 25 y.o. female who presents for a follow up office visit in regards to Follow-up (MRI Review, Chronic right shoulder pain). The patient’s current symptoms include right trapezius pain with the range of motion increased with activity can increase to 7 out of 10 doing well with therapy overall.  Status post right C3-4-5 medial branch ablation with improvement in her neck pain symptoms having residual right trapezius pain that that is painful.  Status post completion of her right shoulder MRI and cervical MRI.        Opioid contract date    Last UDS Date: No results in last 5 years                    last taken on    Review of Systems   Musculoskeletal:  Positive for arthralgias, joint  swelling and myalgias.   All other systems reviewed and are negative.      Medical History Reviewed by provider this encounter:     .       Objective   There were no vitals taken for this visit.     Pain Score:   5 (Can go up to a 7-8 when moving shoulder)  Physical Exam    Cervical Spine:  No masses or atrophy, No TTP cervical facets   Range of motion - full  Spurling's - negative    Strength Right Left   Elbow flexion/deltoid (C5) 5 5   Wrist extension (c6) 5 5   Elbow /finger extension (c7) 5 5   Finger flexion (c8) 5 5   Intrinsics (t1) 5 5        Reflexes:     Right Left   Biceps 2+ 2+   Triceps 2+ 2+   Brachioradialis 2+ 2+   Patellar 3+ 3+   Achilles 2+ 2+   Babinski neg neg        Sensory Exam: Full and equal sensation to light touch throughout bilateral UE  Reflexes: Spear negative Bilaterally; Clonus negative bilaterally      Gait normal    MRI C spine      C1-C2: Normal.     C2-C3: Mild disc bulge, small central disc protrusion. Right uncovertebral hypertrophy. No significant canal stenosis or foraminal narrowing.     C3-C4: Mild disc bulge, small right subarticular disc protrusion. Mild canal stenosis. No significant foraminal narrowing.     C4-C5: Mild disc bulge. Mild canal stenosis. No significant foraminal narrowing.     C5-C6: Mild disc bulge, small left central disc protrusion. Mild canal stenosis. No significant foraminal narrowing.     C6-C7: Mild disc bulge, small central disc protrusion. Mild canal stenosis. No significant foraminal narrowing.     C7-T1: Mild disc bulge. No significant canal stenosis or foraminal narrowing.     Multilevel degenerative changes are similar since MRI cervical spine 10/25/2024.     UPPER THORACIC DISC SPACES:  Normal.     OTHER FINDINGS:  None.     IMPRESSION:     Similar multilevel degenerative changes of cervical spine with varying degrees of canal stenosis (multilevel mild), as detailed above. No significant foraminal narrowing.    MRI Right  shoulder  IMPRESSION:     1.  Mild supraspinatus insertional tendinosis without tear.  2.  Intact long head biceps tendon and intact glenoid labrum.

## 2025-05-28 NOTE — PROGRESS NOTES
"   Universal Protocol:  procedure performed by consultantConsent: Verbal consent obtained. Written consent obtained  Risks and benefits: risks, benefits and alternatives were discussed  Consent given by: patient  Time out: Immediately prior to procedure a \"time out\" was called to verify the correct patient, procedure, equipment, support staff and site/side marked as required.  Patient understanding: patient states understanding of the procedure being performed  Patient consent: the patient's understanding of the procedure matches consent given  Procedure consent: procedure consent matches procedure scheduled  Relevant documents: relevant documents present and verified  Test results: test results available and properly labeled  Site marked: the operative site was marked  Radiology Images displayed and confirmed. If images not available, report reviewed: imaging studies available  Required items: required blood products, implants, devices, and special equipment available  Patient identity confirmed: verbally with patient  Supporting Documentation  Indications: pain   Trigger Point Injections: single/multiple trigger point(s): 1-2 muscle groups    Injection site identified by: ultrasound  Procedure Details  Location(s):    Neck/Upper Back: R upper trapezius, R levator scapulae and R rhomboid   Needle size: 25 G  Medications: 5 mL bupivacaine 0.25 %; 20 mg triamcinolone acetonide 40 mg/mL          "

## 2025-05-30 ENCOUNTER — PATIENT MESSAGE (OUTPATIENT)
Dept: PAIN MEDICINE | Facility: CLINIC | Age: 25
End: 2025-05-30

## 2025-05-30 NOTE — PATIENT COMMUNICATION
Caller: Windy    Doctor: Dr. Medina    Reason for call: returning nurses phone call    Call back#: [phone]

## 2025-05-30 NOTE — TELEPHONE ENCOUNTER
LMOM for pt to cb. CB# and OH provided.    MD Elizabeth Tinoco, RN  Please tell patient if she has symptoms of stiff neck , severe headache fever, nsauie, vomiting, and sensitivity to light please go to ED, otherwise the symptoms are from tight muscles around the neck adnt hat will subside

## 2025-06-02 DIAGNOSIS — M79.18 MYOFASCIAL PAIN SYNDROME: Primary | ICD-10-CM

## 2025-06-02 RX ORDER — CYCLOBENZAPRINE HCL 5 MG
5 TABLET ORAL 3 TIMES DAILY PRN
Qty: 42 TABLET | Refills: 1 | Status: SHIPPED | OUTPATIENT
Start: 2025-06-02

## 2025-06-25 ENCOUNTER — OFFICE VISIT (OUTPATIENT)
Dept: PAIN MEDICINE | Facility: CLINIC | Age: 25
End: 2025-06-25
Payer: COMMERCIAL

## 2025-06-25 VITALS — BODY MASS INDEX: 27.79 KG/M2 | WEIGHT: 151 LBS | HEIGHT: 62 IN

## 2025-06-25 DIAGNOSIS — M79.18 MYOFASCIAL PAIN SYNDROME: ICD-10-CM

## 2025-06-25 DIAGNOSIS — M54.12 CERVICAL RADICULOPATHY: Primary | ICD-10-CM

## 2025-06-25 PROCEDURE — 99214 OFFICE O/P EST MOD 30 MIN: CPT | Performed by: PAIN MEDICINE

## 2025-06-25 PROCEDURE — 20552 NJX 1/MLT TRIGGER POINT 1/2: CPT | Performed by: PAIN MEDICINE

## 2025-06-25 RX ORDER — DULOXETIN HYDROCHLORIDE 20 MG/1
20 CAPSULE, DELAYED RELEASE ORAL DAILY
Qty: 30 CAPSULE | Refills: 1 | Status: SHIPPED | OUTPATIENT
Start: 2025-06-25

## 2025-06-25 RX ORDER — TIZANIDINE 2 MG/1
2 TABLET ORAL EVERY 8 HOURS PRN
Qty: 42 TABLET | Refills: 1 | Status: SHIPPED | OUTPATIENT
Start: 2025-06-25

## 2025-06-25 RX ADMIN — KETOROLAC TROMETHAMINE 30 MG: 30 INJECTION, SOLUTION INTRAMUSCULAR; INTRAVENOUS at 15:00

## 2025-06-25 RX ADMIN — BUPIVACAINE HYDROCHLORIDE 5 ML: 2.5 INJECTION, SOLUTION EPIDURAL; INFILTRATION; INTRACAUDAL; PERINEURAL at 15:00

## 2025-06-26 RX ORDER — BUPIVACAINE HYDROCHLORIDE 2.5 MG/ML
5 INJECTION, SOLUTION EPIDURAL; INFILTRATION; INTRACAUDAL; PERINEURAL
Status: COMPLETED | OUTPATIENT
Start: 2025-06-25 | End: 2025-06-25

## 2025-06-26 RX ORDER — KETOROLAC TROMETHAMINE 30 MG/ML
30 INJECTION, SOLUTION INTRAMUSCULAR; INTRAVENOUS
Status: COMPLETED | OUTPATIENT
Start: 2025-06-25 | End: 2025-06-25

## 2025-06-26 NOTE — PROGRESS NOTES
Name: Windy Alvarez      : 2000      MRN: [id number]  Encounter Provider: Juan Medina MD  Encounter Date: 2025   Encounter department: [facility]  :  Assessment & Plan  Cervical radiculopathy    Orders:  •  DULoxetine (CYMBALTA) 20 mg capsule; Take 1 capsule (20 mg total) by mouth daily  •  tiZANidine (ZANAFLEX) 2 mg tablet; Take 1 tablet (2 mg total) by mouth every 8 (eight) hours as needed for muscle spasms  occasional radicular pain in the Right UE, EMG pending, recommend to go to [facility] rehab to complete  Increased disc bulging from prior MRI indicating likely reason for increased symptoms, despite Right C3-5 RFA  Will start duloxetine 20, tizanidine  If EMG shows radiculopathy from C spine, or increased radicular symptoms, may consider SHARI  Myofascial pain syndrome    Orders:  •  DULoxetine (CYMBALTA) 20 mg capsule; Take 1 capsule (20 mg total) by mouth daily  •  tiZANidine (ZANAFLEX) 2 mg tablet; Take 1 tablet (2 mg total) by mouth every 8 (eight) hours as needed for muscle spasms  Focally tender in the right upper cervical paraspinal, will offer TPI today          My impressions and treatment recommendations were discussed in detail with the patient who verbalized understanding and had no further questions.  Discharge instructions were provided. I personally saw and examined the patient and I agree with the above discussed plan of care.    History of Present Illness       Windy Alvarez is a 25 y.o. female who presents to [facility] for interval re-evaluation in regards to pain in the right side of neck . The patient's last OVS was 25    Since last OVS patient has completed:    TPI since last visit, which helped in the right trapezius region, she has since stopped other pain medication prescribed    Patient describes pain as  sharp pain int the right upper cervical parapsinal, which increased with left neck rotation  "as opposed to right neck rotation.  Symptoms are worse with left neck rotation and looking down . Alleviating factors identifiable by patient are looking straight . On the the numeric pain scale of 1-10, pain typically increases 7/10 , which is currently impacting their quality of life and interferes with their activities of daily living.           Opioid contract date    Last UDS Date: No results in last 5 years                    last taken on    Review of Systems   Musculoskeletal:  Positive for neck pain and neck stiffness.       Medical History Reviewed by provider this encounter:     .       Objective   Ht 5' 2\" (1.575 m)   Wt 68.5 kg (151 lb)   BMI 27.62 kg/m²         Physical Exam  Cervical Spine:  No masses or atrophy, No TTP cervical facets   Range of motion - full  Spurling's - negative     Strength Right Left   Elbow flexion/deltoid (C5) 5 5   Wrist extension (c6) 5 5   Elbow /finger extension (c7) 5 5   Finger flexion (c8) 5 5   Intrinsics (t1) 5 5         Reflexes:     Right Left   Biceps 2+ 2+   Triceps 2+ 2+   Brachioradialis 2+ 2+   Patellar 2+ 2+   Achilles 2+ 2+   Babinski neg neg         Sensory Exam: Full and equal sensation to light touch throughout bilateral UE  Reflexes: Spear negative Bilaterally; Clonus negative bilaterally        Gait normal     MRI C spine        C1-C2: Normal.     C2-C3: Mild disc bulge, small central disc protrusion. Right uncovertebral hypertrophy. No significant canal stenosis or foraminal narrowing.     C3-C4: Mild disc bulge, small right subarticular disc protrusion. Mild canal stenosis. No significant foraminal narrowing.     C4-C5: Mild disc bulge. Mild canal stenosis. No significant foraminal narrowing.     C5-C6: Mild disc bulge, small left central disc protrusion. Mild canal stenosis. No significant foraminal narrowing.     C6-C7: Mild disc bulge, small central disc protrusion. Mild canal stenosis. No significant foraminal narrowing.     C7-T1: Mild disc " bulge. No significant canal stenosis or foraminal narrowing.     Multilevel degenerative changes are similar since MRI cervical spine 10/25/2024.     UPPER THORACIC DISC SPACES:  Normal.     OTHER FINDINGS:  None.     IMPRESSION:     Similar multilevel degenerative changes of cervical spine with varying degrees of canal stenosis (multilevel mild), as detailed above. No significant foraminal narrowing.     MRI Right shoulder  IMPRESSION:     1.  Mild supraspinatus insertional tendinosis without tear.  2.  Intact long head biceps tendon and intact glenoid labrum.

## 2025-06-26 NOTE — PROGRESS NOTES
"   Universal Protocol:  procedure performed by consultantConsent: Verbal consent obtained. Written consent obtained  Risks and benefits: risks, benefits and alternatives were discussed  Consent given by: patient  Time out: Immediately prior to procedure a \"time out\" was called to verify the correct patient, procedure, equipment, support staff and site/side marked as required.  Patient understanding: patient states understanding of the procedure being performed  Patient consent: the patient's understanding of the procedure matches consent given  Procedure consent: procedure consent matches procedure scheduled  Relevant documents: relevant documents present and verified  Test results: test results available and properly labeled  Site marked: the operative site was marked  Radiology Images displayed and confirmed. If images not available, report reviewed: imaging studies available  Required items: required blood products, implants, devices, and special equipment available  Patient identity confirmed: verbally with patient  Supporting Documentation  Indications: pain   Trigger Point Injections: single/multiple trigger point(s): 1-2 muscle groups    Injection site identified by: ultrasound  Procedure Details  Location(s):    Neck/Upper Back: R cervical paraspinals     Prep: patient was prepped and draped in usual sterile fashion  Needle size: 20 G  Medications: 5 mL bupivacaine (PF) 0.25 %; 30 mg ketorolac 30 mg/mL          "

## 2025-07-30 ENCOUNTER — OFFICE VISIT (OUTPATIENT)
Dept: PAIN MEDICINE | Facility: CLINIC | Age: 25
End: 2025-07-30
Payer: COMMERCIAL

## 2025-07-30 VITALS — HEIGHT: 62 IN | WEIGHT: 152 LBS | BODY MASS INDEX: 27.97 KG/M2

## 2025-07-30 DIAGNOSIS — M54.12 CERVICAL RADICULOPATHY: ICD-10-CM

## 2025-07-30 DIAGNOSIS — M79.18 MYOFASCIAL PAIN SYNDROME: ICD-10-CM

## 2025-07-30 PROCEDURE — 20552 NJX 1/MLT TRIGGER POINT 1/2: CPT | Performed by: PAIN MEDICINE

## 2025-07-30 PROCEDURE — 99214 OFFICE O/P EST MOD 30 MIN: CPT | Performed by: PAIN MEDICINE

## 2025-07-30 RX ORDER — TIZANIDINE 2 MG/1
2 TABLET ORAL EVERY 8 HOURS PRN
Qty: 90 TABLET | Refills: 1 | Status: SHIPPED | OUTPATIENT
Start: 2025-07-30

## 2025-07-30 RX ORDER — BUPIVACAINE HYDROCHLORIDE 2.5 MG/ML
8 INJECTION, SOLUTION INFILTRATION; PERINEURAL
Status: COMPLETED | OUTPATIENT
Start: 2025-07-30 | End: 2025-07-30

## 2025-07-30 RX ORDER — TRIAMCINOLONE ACETONIDE 40 MG/ML
20 INJECTION, SUSPENSION INTRA-ARTICULAR; INTRAMUSCULAR
Status: COMPLETED | OUTPATIENT
Start: 2025-07-30 | End: 2025-07-30

## 2025-07-30 RX ADMIN — TRIAMCINOLONE ACETONIDE 20 MG: 40 INJECTION, SUSPENSION INTRA-ARTICULAR; INTRAMUSCULAR at 16:00

## 2025-07-30 RX ADMIN — BUPIVACAINE HYDROCHLORIDE 8 ML: 2.5 INJECTION, SOLUTION INFILTRATION; PERINEURAL at 16:00

## 2025-07-31 ENCOUNTER — TELEPHONE (OUTPATIENT)
Age: 25
End: 2025-07-31

## 2025-08-15 ENCOUNTER — HOSPITAL ENCOUNTER (OUTPATIENT)
Dept: RADIOLOGY | Facility: HOSPITAL | Age: 25
Discharge: HOME | End: 2025-08-15
Attending: PAIN MEDICINE
Payer: COMMERCIAL

## 2025-08-15 ENCOUNTER — TELEPHONE (OUTPATIENT)
Age: 25
End: 2025-08-15